# Patient Record
Sex: FEMALE | Race: WHITE | NOT HISPANIC OR LATINO | Employment: OTHER | URBAN - METROPOLITAN AREA
[De-identification: names, ages, dates, MRNs, and addresses within clinical notes are randomized per-mention and may not be internally consistent; named-entity substitution may affect disease eponyms.]

---

## 2017-01-01 ENCOUNTER — HISTORICAL (OUTPATIENT)
Dept: ADMINISTRATIVE | Facility: HOSPITAL | Age: 82
End: 2017-01-01

## 2017-01-01 ENCOUNTER — OFFICE VISIT (OUTPATIENT)
Dept: PULMONOLOGY | Facility: CLINIC | Age: 82
End: 2017-01-01
Payer: MEDICARE

## 2017-01-01 ENCOUNTER — TELEPHONE (OUTPATIENT)
Dept: PULMONOLOGY | Facility: CLINIC | Age: 82
End: 2017-01-01

## 2017-01-01 ENCOUNTER — HOSPITAL ENCOUNTER (INPATIENT)
Facility: HOSPITAL | Age: 82
LOS: 7 days | Discharge: HOSPICE | DRG: 280 | End: 2017-04-24
Attending: INTERNAL MEDICINE | Admitting: INTERNAL MEDICINE
Payer: MEDICARE

## 2017-01-01 VITALS
HEART RATE: 112 BPM | RESPIRATION RATE: 13 BRPM | HEIGHT: 63 IN | WEIGHT: 208.56 LBS | DIASTOLIC BLOOD PRESSURE: 65 MMHG | BODY MASS INDEX: 36.95 KG/M2 | OXYGEN SATURATION: 97 % | SYSTOLIC BLOOD PRESSURE: 100 MMHG | TEMPERATURE: 98 F

## 2017-01-01 VITALS
BODY MASS INDEX: 33.66 KG/M2 | HEIGHT: 63 IN | OXYGEN SATURATION: 94 % | HEART RATE: 54 BPM | DIASTOLIC BLOOD PRESSURE: 68 MMHG | SYSTOLIC BLOOD PRESSURE: 157 MMHG | WEIGHT: 190 LBS

## 2017-01-01 DIAGNOSIS — I50.9 HEART FAILURE, UNSPECIFIED: ICD-10-CM

## 2017-01-01 DIAGNOSIS — E11.40 TYPE 2 DIABETES MELLITUS WITH DIABETIC NEUROPATHY, WITH LONG-TERM CURRENT USE OF INSULIN: ICD-10-CM

## 2017-01-01 DIAGNOSIS — Z79.4 TYPE 2 DIABETES MELLITUS WITH DIABETIC NEUROPATHY, WITH LONG-TERM CURRENT USE OF INSULIN: ICD-10-CM

## 2017-01-01 DIAGNOSIS — E03.4 HYPOTHYROIDISM DUE TO ACQUIRED ATROPHY OF THYROID: ICD-10-CM

## 2017-01-01 DIAGNOSIS — I13.0 HYPERTENSIVE HEART AND KIDNEY DISEASE WITH HEART FAILURE: ICD-10-CM

## 2017-01-01 DIAGNOSIS — D64.9 ANEMIA, UNSPECIFIED TYPE: ICD-10-CM

## 2017-01-01 DIAGNOSIS — I48.0 PAROXYSMAL A-FIB: ICD-10-CM

## 2017-01-01 DIAGNOSIS — I25.810 CORONARY ARTERY DISEASE INVOLVING CORONARY BYPASS GRAFT OF NATIVE HEART WITHOUT ANGINA PECTORIS: ICD-10-CM

## 2017-01-01 DIAGNOSIS — N28.9 ACUTE RENAL INSUFFICIENCY: ICD-10-CM

## 2017-01-01 DIAGNOSIS — G47.33 OSA (OBSTRUCTIVE SLEEP APNEA): ICD-10-CM

## 2017-01-01 DIAGNOSIS — J45.20 INTERMITTENT ASTHMA, UNCOMPLICATED: ICD-10-CM

## 2017-01-01 DIAGNOSIS — R09.89 CHRONIC SINUS COMPLAINTS: ICD-10-CM

## 2017-01-01 DIAGNOSIS — Z98.890 S/P ROTATOR CUFF SURGERY: Primary | ICD-10-CM

## 2017-01-01 DIAGNOSIS — K76.1 CHRONIC PASSIVE CONGESTION OF LIVER: ICD-10-CM

## 2017-01-01 DIAGNOSIS — I21.09: ICD-10-CM

## 2017-01-01 DIAGNOSIS — R68.84 JAW PAIN: ICD-10-CM

## 2017-01-01 DIAGNOSIS — Q21.0 VSD (VENTRICULAR SEPTAL DEFECT): ICD-10-CM

## 2017-01-01 DIAGNOSIS — E11.42 DIABETIC POLYNEUROPATHY ASSOCIATED WITH TYPE 2 DIABETES MELLITUS: ICD-10-CM

## 2017-01-01 DIAGNOSIS — I10 ESSENTIAL HYPERTENSION: ICD-10-CM

## 2017-01-01 DIAGNOSIS — R74.01 ELEVATED TRANSAMINASE LEVEL: ICD-10-CM

## 2017-01-01 DIAGNOSIS — I21.09 ACUTE ANTERIOR WALL MI: ICD-10-CM

## 2017-01-01 LAB
ABO + RH BLD: NORMAL
ALBUMIN SERPL BCP-MCNC: 2.7 G/DL
ALBUMIN SERPL BCP-MCNC: 2.7 G/DL
ALBUMIN SERPL BCP-MCNC: 2.8 G/DL
ALBUMIN SERPL BCP-MCNC: 2.8 G/DL
ALBUMIN SERPL BCP-MCNC: 2.9 G/DL
ALBUMIN SERPL BCP-MCNC: 3 G/DL
ALLENS TEST: ABNORMAL
ALP SERPL-CCNC: 109 U/L
ALP SERPL-CCNC: 110 U/L
ALP SERPL-CCNC: 116 U/L
ALP SERPL-CCNC: 66 U/L
ALP SERPL-CCNC: 67 U/L
ALP SERPL-CCNC: 67 U/L
ALP SERPL-CCNC: 76 U/L
ALP SERPL-CCNC: 79 U/L
ALP SERPL-CCNC: 89 U/L
ALT SERPL W/O P-5'-P-CCNC: 104 U/L
ALT SERPL W/O P-5'-P-CCNC: 139 U/L
ALT SERPL W/O P-5'-P-CCNC: 158 U/L
ALT SERPL W/O P-5'-P-CCNC: 188 U/L
ALT SERPL W/O P-5'-P-CCNC: 44 U/L
ALT SERPL W/O P-5'-P-CCNC: 53 U/L
ALT SERPL W/O P-5'-P-CCNC: 53 U/L
ALT SERPL W/O P-5'-P-CCNC: 66 U/L
ALT SERPL W/O P-5'-P-CCNC: 81 U/L
ANION GAP SERPL CALC-SCNC: 10 MMOL/L
ANION GAP SERPL CALC-SCNC: 10 MMOL/L
ANION GAP SERPL CALC-SCNC: 12 MMOL/L
ANION GAP SERPL CALC-SCNC: 13 MMOL/L
ANION GAP SERPL CALC-SCNC: 13 MMOL/L
ANION GAP SERPL CALC-SCNC: 15 MMOL/L
ANION GAP SERPL CALC-SCNC: 15 MMOL/L
ANION GAP SERPL CALC-SCNC: 17 MMOL/L
ANISOCYTOSIS BLD QL SMEAR: ABNORMAL
AORTIC VALVE STENOSIS: ABNORMAL
APTT BLDCRRT: 27.1 SEC
AST SERPL-CCNC: 21 U/L
AST SERPL-CCNC: 22 U/L
AST SERPL-CCNC: 22 U/L
AST SERPL-CCNC: 24 U/L
AST SERPL-CCNC: 29 U/L
AST SERPL-CCNC: 39 U/L
AST SERPL-CCNC: 61 U/L
AST SERPL-CCNC: 62 U/L
AST SERPL-CCNC: 76 U/L
BACTERIA #/AREA URNS AUTO: ABNORMAL /HPF
BASOPHILS # BLD AUTO: 0 K/UL
BASOPHILS # BLD AUTO: 0.02 K/UL
BASOPHILS # BLD AUTO: 0.02 K/UL
BASOPHILS NFR BLD: 0 %
BASOPHILS NFR BLD: 0.2 %
BASOPHILS NFR BLD: 0.2 %
BILIRUB DIRECT SERPL-MCNC: 1 MG/DL
BILIRUB SERPL-MCNC: 1.2 MG/DL
BILIRUB SERPL-MCNC: 1.3 MG/DL
BILIRUB SERPL-MCNC: 1.4 MG/DL
BILIRUB SERPL-MCNC: 1.5 MG/DL
BILIRUB SERPL-MCNC: 1.6 MG/DL
BILIRUB SERPL-MCNC: 1.6 MG/DL
BILIRUB SERPL-MCNC: 1.7 MG/DL
BILIRUB UR QL STRIP: NEGATIVE
BLD GP AB SCN CELLS X3 SERPL QL: NORMAL
BNP SERPL-MCNC: 2688 PG/ML
BNP SERPL-MCNC: 3231 PG/ML
BUN SERPL-MCNC: 41 MG/DL
BUN SERPL-MCNC: 44 MG/DL
BUN SERPL-MCNC: 50 MG/DL
BUN SERPL-MCNC: 53 MG/DL
BUN SERPL-MCNC: 55 MG/DL
BUN SERPL-MCNC: 56 MG/DL
BUN SERPL-MCNC: 60 MG/DL
BUN SERPL-MCNC: 64 MG/DL
CALCIUM SERPL-MCNC: 8.5 MG/DL
CALCIUM SERPL-MCNC: 8.8 MG/DL
CALCIUM SERPL-MCNC: 8.8 MG/DL
CALCIUM SERPL-MCNC: 8.9 MG/DL
CALCIUM SERPL-MCNC: 9 MG/DL
CALCIUM SERPL-MCNC: 9.1 MG/DL
CALCIUM SERPL-MCNC: 9.2 MG/DL
CALCIUM SERPL-MCNC: 9.3 MG/DL
CHLORIDE SERPL-SCNC: 86 MMOL/L
CHLORIDE SERPL-SCNC: 90 MMOL/L
CHLORIDE SERPL-SCNC: 91 MMOL/L
CHLORIDE SERPL-SCNC: 91 MMOL/L
CHLORIDE SERPL-SCNC: 94 MMOL/L
CHLORIDE SERPL-SCNC: 94 MMOL/L
CHLORIDE SERPL-SCNC: 96 MMOL/L
CHLORIDE SERPL-SCNC: 98 MMOL/L
CHOLEST/HDLC SERPL: 6.3 {RATIO}
CLARITY UR REFRACT.AUTO: ABNORMAL
CO2 SERPL-SCNC: 22 MMOL/L
CO2 SERPL-SCNC: 27 MMOL/L
CO2 SERPL-SCNC: 27 MMOL/L
CO2 SERPL-SCNC: 28 MMOL/L
CO2 SERPL-SCNC: 29 MMOL/L
CO2 SERPL-SCNC: 32 MMOL/L
CO2 SERPL-SCNC: 33 MMOL/L
CO2 SERPL-SCNC: 35 MMOL/L
COLOR UR AUTO: ABNORMAL
CREAT SERPL-MCNC: 1.5 MG/DL
CREAT SERPL-MCNC: 1.6 MG/DL
CREAT SERPL-MCNC: 2.3 MG/DL
CREAT SERPL-MCNC: 2.3 MG/DL
CREAT SERPL-MCNC: 2.4 MG/DL
CREAT SERPL-MCNC: 2.5 MG/DL
CREAT SERPL-MCNC: 2.7 MG/DL
CREAT SERPL-MCNC: 2.9 MG/DL
DELSYS: ABNORMAL
DIASTOLIC DYSFUNCTION: YES
DIFFERENTIAL METHOD: ABNORMAL
EOSINOPHIL # BLD AUTO: 0 K/UL
EOSINOPHIL # BLD AUTO: 0.1 K/UL
EOSINOPHIL NFR BLD: 0 %
EOSINOPHIL NFR BLD: 0.1 %
EOSINOPHIL NFR BLD: 0.1 %
EOSINOPHIL NFR BLD: 0.3 %
EOSINOPHIL NFR BLD: 0.5 %
EOSINOPHIL NFR BLD: 0.5 %
EOSINOPHIL NFR BLD: 0.9 %
ERYTHROCYTE [DISTWIDTH] IN BLOOD BY AUTOMATED COUNT: 20.5 %
ERYTHROCYTE [DISTWIDTH] IN BLOOD BY AUTOMATED COUNT: 20.9 %
ERYTHROCYTE [DISTWIDTH] IN BLOOD BY AUTOMATED COUNT: 21.1 %
ERYTHROCYTE [DISTWIDTH] IN BLOOD BY AUTOMATED COUNT: 21.3 %
EST. GFR  (AFRICAN AMERICAN): 17 ML/MIN/1.73 M^2
EST. GFR  (AFRICAN AMERICAN): 18.5 ML/MIN/1.73 M^2
EST. GFR  (AFRICAN AMERICAN): 20.3 ML/MIN/1.73 M^2
EST. GFR  (AFRICAN AMERICAN): 21.3 ML/MIN/1.73 M^2
EST. GFR  (AFRICAN AMERICAN): 22.5 ML/MIN/1.73 M^2
EST. GFR  (AFRICAN AMERICAN): 22.5 ML/MIN/1.73 M^2
EST. GFR  (AFRICAN AMERICAN): 34.8 ML/MIN/1.73 M^2
EST. GFR  (AFRICAN AMERICAN): 37.7 ML/MIN/1.73 M^2
EST. GFR  (NON AFRICAN AMERICAN): 14.7 ML/MIN/1.73 M^2
EST. GFR  (NON AFRICAN AMERICAN): 16.1 ML/MIN/1.73 M^2
EST. GFR  (NON AFRICAN AMERICAN): 17.6 ML/MIN/1.73 M^2
EST. GFR  (NON AFRICAN AMERICAN): 18.5 ML/MIN/1.73 M^2
EST. GFR  (NON AFRICAN AMERICAN): 19.5 ML/MIN/1.73 M^2
EST. GFR  (NON AFRICAN AMERICAN): 19.5 ML/MIN/1.73 M^2
EST. GFR  (NON AFRICAN AMERICAN): 30.2 ML/MIN/1.73 M^2
EST. GFR  (NON AFRICAN AMERICAN): 32.7 ML/MIN/1.73 M^2
ESTIMATED AVG GLUCOSE: 174 MG/DL
ESTIMATED PA SYSTOLIC PRESSURE: 64.25
FACT X PPP CHRO-ACNC: 0.23 IU/ML
FACT X PPP CHRO-ACNC: 0.27 IU/ML
FACT X PPP CHRO-ACNC: 0.41 IU/ML
FACT X PPP CHRO-ACNC: 0.47 IU/ML
FACT X PPP CHRO-ACNC: 0.6 IU/ML
FACT X PPP CHRO-ACNC: 0.62 IU/ML
FACT X PPP CHRO-ACNC: 0.71 IU/ML
FACT X PPP CHRO-ACNC: 0.82 IU/ML
FACT X PPP CHRO-ACNC: 0.84 IU/ML
FACT X PPP CHRO-ACNC: 0.99 IU/ML
FACT X PPP CHRO-ACNC: 1.02 IU/ML
FACT X PPP CHRO-ACNC: 1.1 IU/ML
FACT X PPP CHRO-ACNC: 1.2 IU/ML
FACT X PPP CHRO-ACNC: 1.35 IU/ML
FACT X PPP CHRO-ACNC: 1.35 IU/ML
FERRITIN SERPL-MCNC: 63 NG/ML
FOLATE SERPL-MCNC: 17 NG/ML
GLUCOSE SERPL-MCNC: 152 MG/DL
GLUCOSE SERPL-MCNC: 196 MG/DL
GLUCOSE SERPL-MCNC: 215 MG/DL
GLUCOSE SERPL-MCNC: 220 MG/DL
GLUCOSE SERPL-MCNC: 230 MG/DL
GLUCOSE SERPL-MCNC: 272 MG/DL
GLUCOSE SERPL-MCNC: 295 MG/DL
GLUCOSE SERPL-MCNC: 300 MG/DL
GLUCOSE UR QL STRIP: NEGATIVE
HAPTOGLOB SERPL-MCNC: 123 MG/DL
HBA1C MFR BLD HPLC: 7.7 %
HCO3 UR-SCNC: 25.3 MMOL/L (ref 24–28)
HCT VFR BLD AUTO: 27.2 %
HCT VFR BLD AUTO: 28.6 %
HCT VFR BLD AUTO: 28.7 %
HCT VFR BLD AUTO: 29.1 %
HCT VFR BLD AUTO: 30.5 %
HCT VFR BLD AUTO: 30.8 %
HCT VFR BLD AUTO: 33.6 %
HDL/CHOLESTEROL RATIO: 16 %
HDLC SERPL-MCNC: 119 MG/DL
HDLC SERPL-MCNC: 19 MG/DL
HGB BLD-MCNC: 10.4 G/DL
HGB BLD-MCNC: 8.4 G/DL
HGB BLD-MCNC: 8.5 G/DL
HGB BLD-MCNC: 8.7 G/DL
HGB BLD-MCNC: 8.8 G/DL
HGB BLD-MCNC: 9.5 G/DL
HGB BLD-MCNC: 9.6 G/DL
HGB UR QL STRIP: ABNORMAL
HYALINE CASTS UR QL AUTO: 0 /LPF
INR PPP: 1.2
INR PPP: 1.3
INR PPP: 1.3
INR PPP: 1.5
IRON SERPL-MCNC: 29 UG/DL
KETONES UR QL STRIP: NEGATIVE
LACTATE SERPL-SCNC: 1.3 MMOL/L
LACTATE SERPL-SCNC: 2 MMOL/L
LDH SERPL L TO P-CCNC: 256 U/L
LDLC SERPL CALC-MCNC: 87 MG/DL
LEUKOCYTE ESTERASE UR QL STRIP: ABNORMAL
LYMPHOCYTES # BLD AUTO: 1.4 K/UL
LYMPHOCYTES # BLD AUTO: 2.1 K/UL
LYMPHOCYTES # BLD AUTO: 2.1 K/UL
LYMPHOCYTES # BLD AUTO: 2.7 K/UL
LYMPHOCYTES # BLD AUTO: 2.8 K/UL
LYMPHOCYTES # BLD AUTO: 4 K/UL
LYMPHOCYTES # BLD AUTO: 4.6 K/UL
LYMPHOCYTES NFR BLD: 17.5 %
LYMPHOCYTES NFR BLD: 27.3 %
LYMPHOCYTES NFR BLD: 27.8 %
LYMPHOCYTES NFR BLD: 34.4 %
LYMPHOCYTES NFR BLD: 38.5 %
LYMPHOCYTES NFR BLD: 49.4 %
LYMPHOCYTES NFR BLD: 56.4 %
MAGNESIUM SERPL-MCNC: 1.5 MG/DL
MAGNESIUM SERPL-MCNC: 1.8 MG/DL
MAGNESIUM SERPL-MCNC: 1.9 MG/DL
MAGNESIUM SERPL-MCNC: 2.1 MG/DL
MCH RBC QN AUTO: 24.5 PG
MCH RBC QN AUTO: 24.9 PG
MCH RBC QN AUTO: 25.1 PG
MCH RBC QN AUTO: 25.3 PG
MCH RBC QN AUTO: 25.5 PG
MCHC RBC AUTO-ENTMCNC: 29.4 %
MCHC RBC AUTO-ENTMCNC: 29.9 %
MCHC RBC AUTO-ENTMCNC: 30.7 %
MCHC RBC AUTO-ENTMCNC: 31 %
MCHC RBC AUTO-ENTMCNC: 31.1 %
MCHC RBC AUTO-ENTMCNC: 31.2 %
MCHC RBC AUTO-ENTMCNC: 31.3 %
MCV RBC AUTO: 81 FL
MCV RBC AUTO: 81 FL
MCV RBC AUTO: 82 FL
MCV RBC AUTO: 83 FL
MCV RBC AUTO: 83 FL
MICROSCOPIC COMMENT: ABNORMAL
MITRAL VALVE MOBILITY: NORMAL
MITRAL VALVE REGURGITATION: ABNORMAL
MONOCYTES # BLD AUTO: 0.6 K/UL
MONOCYTES # BLD AUTO: 0.7 K/UL
MONOCYTES # BLD AUTO: 0.8 K/UL
MONOCYTES # BLD AUTO: 1 K/UL
MONOCYTES # BLD AUTO: 1 K/UL
MONOCYTES NFR BLD: 10.2 %
MONOCYTES NFR BLD: 10.3 %
MONOCYTES NFR BLD: 11.7 %
MONOCYTES NFR BLD: 8.7 %
MONOCYTES NFR BLD: 9.1 %
MONOCYTES NFR BLD: 9.6 %
MONOCYTES NFR BLD: 9.7 %
NEUTROPHILS # BLD AUTO: 2.8 K/UL
NEUTROPHILS # BLD AUTO: 3.1 K/UL
NEUTROPHILS # BLD AUTO: 3.3 K/UL
NEUTROPHILS # BLD AUTO: 3.7 K/UL
NEUTROPHILS # BLD AUTO: 4.6 K/UL
NEUTROPHILS # BLD AUTO: 6 K/UL
NEUTROPHILS # BLD AUTO: 6.3 K/UL
NEUTROPHILS NFR BLD: 34 %
NEUTROPHILS NFR BLD: 37.7 %
NEUTROPHILS NFR BLD: 51.3 %
NEUTROPHILS NFR BLD: 54.9 %
NEUTROPHILS NFR BLD: 61.5 %
NEUTROPHILS NFR BLD: 62.6 %
NEUTROPHILS NFR BLD: 73.4 %
NITRITE UR QL STRIP: NEGATIVE
NONHDLC SERPL-MCNC: 100 MG/DL
OVALOCYTES BLD QL SMEAR: ABNORMAL
PCO2 BLDA: 33.9 MMHG (ref 35–45)
PH SMN: 7.48 [PH] (ref 7.35–7.45)
PH UR STRIP: 5 [PH] (ref 5–8)
PLATELET # BLD AUTO: 171 K/UL
PLATELET # BLD AUTO: 175 K/UL
PLATELET # BLD AUTO: 181 K/UL
PLATELET # BLD AUTO: 213 K/UL
PLATELET # BLD AUTO: 224 K/UL
PLATELET # BLD AUTO: 263 K/UL
PLATELET # BLD AUTO: 276 K/UL
PLATELET BLD QL SMEAR: ABNORMAL
PMV BLD AUTO: 10.2 FL
PMV BLD AUTO: 10.3 FL
PMV BLD AUTO: 10.3 FL
PMV BLD AUTO: 10.5 FL
PMV BLD AUTO: 10.6 FL
PO2 BLDA: 70 MMHG (ref 80–100)
POC BE: 2 MMOL/L
POC SATURATED O2: 95 % (ref 95–100)
POC TCO2: 26 MMOL/L (ref 23–27)
POCT GLUCOSE: 123 MG/DL (ref 70–110)
POCT GLUCOSE: 170 MG/DL (ref 70–110)
POCT GLUCOSE: 189 MG/DL (ref 70–110)
POCT GLUCOSE: 203 MG/DL (ref 70–110)
POCT GLUCOSE: 206 MG/DL (ref 70–110)
POCT GLUCOSE: 226 MG/DL (ref 70–110)
POCT GLUCOSE: 235 MG/DL (ref 70–110)
POCT GLUCOSE: 237 MG/DL (ref 70–110)
POCT GLUCOSE: 237 MG/DL (ref 70–110)
POCT GLUCOSE: 243 MG/DL (ref 70–110)
POCT GLUCOSE: 254 MG/DL (ref 70–110)
POCT GLUCOSE: 255 MG/DL (ref 70–110)
POCT GLUCOSE: 261 MG/DL (ref 70–110)
POCT GLUCOSE: 265 MG/DL (ref 70–110)
POCT GLUCOSE: 277 MG/DL (ref 70–110)
POCT GLUCOSE: 294 MG/DL (ref 70–110)
POCT GLUCOSE: 296 MG/DL (ref 70–110)
POCT GLUCOSE: 304 MG/DL (ref 70–110)
POCT GLUCOSE: 306 MG/DL (ref 70–110)
POCT GLUCOSE: 306 MG/DL (ref 70–110)
POCT GLUCOSE: 316 MG/DL (ref 70–110)
POCT GLUCOSE: 318 MG/DL (ref 70–110)
POCT GLUCOSE: 320 MG/DL (ref 70–110)
POCT GLUCOSE: 333 MG/DL (ref 70–110)
POCT GLUCOSE: 334 MG/DL (ref 70–110)
POCT GLUCOSE: 349 MG/DL (ref 70–110)
POCT GLUCOSE: 354 MG/DL (ref 70–110)
POCT GLUCOSE: 371 MG/DL (ref 70–110)
POIKILOCYTOSIS BLD QL SMEAR: SLIGHT
POLYCHROMASIA BLD QL SMEAR: ABNORMAL
POTASSIUM SERPL-SCNC: 3.3 MMOL/L
POTASSIUM SERPL-SCNC: 3.3 MMOL/L
POTASSIUM SERPL-SCNC: 3.4 MMOL/L
POTASSIUM SERPL-SCNC: 3.5 MMOL/L
POTASSIUM SERPL-SCNC: 3.7 MMOL/L
POTASSIUM SERPL-SCNC: 4 MMOL/L
POTASSIUM SERPL-SCNC: 4.3 MMOL/L
POTASSIUM SERPL-SCNC: 4.7 MMOL/L
PROT SERPL-MCNC: 6 G/DL
PROT SERPL-MCNC: 6.2 G/DL
PROT SERPL-MCNC: 6.4 G/DL
PROT SERPL-MCNC: 6.4 G/DL
PROT SERPL-MCNC: 6.5 G/DL
PROT SERPL-MCNC: 6.7 G/DL
PROT SERPL-MCNC: 7 G/DL
PROT UR QL STRIP: ABNORMAL
PROTHROMBIN TIME: 12.4 SEC
PROTHROMBIN TIME: 13.5 SEC
PROTHROMBIN TIME: 13.7 SEC
PROTHROMBIN TIME: 15.2 SEC
RBC # BLD AUTO: 3.36 M/UL
RBC # BLD AUTO: 3.43 M/UL
RBC # BLD AUTO: 3.49 M/UL
RBC # BLD AUTO: 3.51 M/UL
RBC # BLD AUTO: 3.72 M/UL
RBC # BLD AUTO: 3.79 M/UL
RBC # BLD AUTO: 4.11 M/UL
RBC #/AREA URNS AUTO: >100 /HPF (ref 0–4)
RETICS/RBC NFR AUTO: 5.7 %
RETIRED EF AND QEF - SEE NOTES: 20 (ref 55–65)
SAMPLE: ABNORMAL
SATURATED IRON: 7 %
SCHISTOCYTES BLD QL SMEAR: PRESENT
SITE: ABNORMAL
SODIUM SERPL-SCNC: 133 MMOL/L
SODIUM SERPL-SCNC: 133 MMOL/L
SODIUM SERPL-SCNC: 134 MMOL/L
SODIUM SERPL-SCNC: 134 MMOL/L
SODIUM SERPL-SCNC: 135 MMOL/L
SODIUM SERPL-SCNC: 136 MMOL/L
SODIUM SERPL-SCNC: 136 MMOL/L
SODIUM SERPL-SCNC: 137 MMOL/L
SP GR UR STRIP: 1.01 (ref 1–1.03)
T4 FREE SERPL-MCNC: 1.14 NG/DL
TOTAL IRON BINDING CAPACITY: 420 UG/DL
TRANSFERRIN SERPL-MCNC: 284 MG/DL
TRICUSPID VALVE REGURGITATION: ABNORMAL
TRIGL SERPL-MCNC: 65 MG/DL
TROPONIN I SERPL DL<=0.01 NG/ML-MCNC: 0.38 NG/ML
TROPONIN I SERPL DL<=0.01 NG/ML-MCNC: 0.39 NG/ML
TSH SERPL DL<=0.005 MIU/L-ACNC: 2.58 UIU/ML
URN SPEC COLLECT METH UR: ABNORMAL
UROBILINOGEN UR STRIP-ACNC: NEGATIVE EU/DL
WBC # BLD AUTO: 10.01 K/UL
WBC # BLD AUTO: 6.05 K/UL
WBC # BLD AUTO: 7.3 K/UL
WBC # BLD AUTO: 7.49 K/UL
WBC # BLD AUTO: 8.1 K/UL
WBC # BLD AUTO: 8.17 K/UL
WBC # BLD AUTO: 8.17 K/UL
WBC #/AREA URNS AUTO: 4 /HPF (ref 0–5)

## 2017-01-01 PROCEDURE — 63600175 PHARM REV CODE 636 W HCPCS: Performed by: NURSE PRACTITIONER

## 2017-01-01 PROCEDURE — 99233 SBSQ HOSP IP/OBS HIGH 50: CPT | Mod: ,,, | Performed by: NURSE PRACTITIONER

## 2017-01-01 PROCEDURE — 83735 ASSAY OF MAGNESIUM: CPT

## 2017-01-01 PROCEDURE — 85520 HEPARIN ASSAY: CPT | Mod: 91

## 2017-01-01 PROCEDURE — 25000003 PHARM REV CODE 250: Performed by: INTERNAL MEDICINE

## 2017-01-01 PROCEDURE — 99223 1ST HOSP IP/OBS HIGH 75: CPT | Mod: ,,, | Performed by: INTERNAL MEDICINE

## 2017-01-01 PROCEDURE — 25000003 PHARM REV CODE 250: Performed by: NURSE PRACTITIONER

## 2017-01-01 PROCEDURE — 63600175 PHARM REV CODE 636 W HCPCS: Performed by: INTERNAL MEDICINE

## 2017-01-01 PROCEDURE — 85045 AUTOMATED RETICULOCYTE COUNT: CPT

## 2017-01-01 PROCEDURE — 25000003 PHARM REV CODE 250: Performed by: HOSPITALIST

## 2017-01-01 PROCEDURE — 93005 ELECTROCARDIOGRAM TRACING: CPT

## 2017-01-01 PROCEDURE — 84443 ASSAY THYROID STIM HORMONE: CPT

## 2017-01-01 PROCEDURE — 80053 COMPREHEN METABOLIC PANEL: CPT

## 2017-01-01 PROCEDURE — 85730 THROMBOPLASTIN TIME PARTIAL: CPT

## 2017-01-01 PROCEDURE — 80061 LIPID PANEL: CPT

## 2017-01-01 PROCEDURE — 76937 US GUIDE VASCULAR ACCESS: CPT

## 2017-01-01 PROCEDURE — 27000221 HC OXYGEN, UP TO 24 HOURS

## 2017-01-01 PROCEDURE — 99233 SBSQ HOSP IP/OBS HIGH 50: CPT | Mod: ,,, | Performed by: INTERNAL MEDICINE

## 2017-01-01 PROCEDURE — 93010 ELECTROCARDIOGRAM REPORT: CPT | Mod: ,,, | Performed by: INTERNAL MEDICINE

## 2017-01-01 PROCEDURE — 85025 COMPLETE CBC W/AUTO DIFF WBC: CPT

## 2017-01-01 PROCEDURE — 94760 N-INVAS EAR/PLS OXIMETRY 1: CPT

## 2017-01-01 PROCEDURE — 85520 HEPARIN ASSAY: CPT

## 2017-01-01 PROCEDURE — 94640 AIRWAY INHALATION TREATMENT: CPT

## 2017-01-01 PROCEDURE — 36415 COLL VENOUS BLD VENIPUNCTURE: CPT

## 2017-01-01 PROCEDURE — 1126F AMNT PAIN NOTED NONE PRSNT: CPT | Mod: S$GLB,,, | Performed by: INTERNAL MEDICINE

## 2017-01-01 PROCEDURE — 25000003 PHARM REV CODE 250

## 2017-01-01 PROCEDURE — 99232 SBSQ HOSP IP/OBS MODERATE 35: CPT | Mod: ,,, | Performed by: INTERNAL MEDICINE

## 2017-01-01 PROCEDURE — 93306 TTE W/DOPPLER COMPLETE: CPT | Mod: 26,,, | Performed by: INTERNAL MEDICINE

## 2017-01-01 PROCEDURE — 97530 THERAPEUTIC ACTIVITIES: CPT

## 2017-01-01 PROCEDURE — 82746 ASSAY OF FOLIC ACID SERUM: CPT

## 2017-01-01 PROCEDURE — 99239 HOSP IP/OBS DSCHRG MGMT >30: CPT | Mod: ,,, | Performed by: INTERNAL MEDICINE

## 2017-01-01 PROCEDURE — 83605 ASSAY OF LACTIC ACID: CPT

## 2017-01-01 PROCEDURE — 99291 CRITICAL CARE FIRST HOUR: CPT | Mod: ,,, | Performed by: INTERNAL MEDICINE

## 2017-01-01 PROCEDURE — 94761 N-INVAS EAR/PLS OXIMETRY MLT: CPT

## 2017-01-01 PROCEDURE — 83880 ASSAY OF NATRIURETIC PEPTIDE: CPT

## 2017-01-01 PROCEDURE — 02HV33Z INSERTION OF INFUSION DEVICE INTO SUPERIOR VENA CAVA, PERCUTANEOUS APPROACH: ICD-10-PCS | Performed by: INTERNAL MEDICINE

## 2017-01-01 PROCEDURE — 85610 PROTHROMBIN TIME: CPT

## 2017-01-01 PROCEDURE — 86900 BLOOD TYPING SEROLOGIC ABO: CPT

## 2017-01-01 PROCEDURE — 1157F ADVNC CARE PLAN IN RCRD: CPT | Mod: S$GLB,,, | Performed by: INTERNAL MEDICINE

## 2017-01-01 PROCEDURE — 36600 WITHDRAWAL OF ARTERIAL BLOOD: CPT

## 2017-01-01 PROCEDURE — 83036 HEMOGLOBIN GLYCOSYLATED A1C: CPT

## 2017-01-01 PROCEDURE — C1751 CATH, INF, PER/CENT/MIDLINE: HCPCS

## 2017-01-01 PROCEDURE — 20000000 HC ICU ROOM

## 2017-01-01 PROCEDURE — 81001 URINALYSIS AUTO W/SCOPE: CPT

## 2017-01-01 PROCEDURE — 20600001 HC STEP DOWN PRIVATE ROOM

## 2017-01-01 PROCEDURE — 83540 ASSAY OF IRON: CPT

## 2017-01-01 PROCEDURE — 84484 ASSAY OF TROPONIN QUANT: CPT | Mod: 91

## 2017-01-01 PROCEDURE — 99223 1ST HOSP IP/OBS HIGH 75: CPT | Mod: ,,, | Performed by: NURSE PRACTITIONER

## 2017-01-01 PROCEDURE — 82803 BLOOD GASES ANY COMBINATION: CPT

## 2017-01-01 PROCEDURE — 84439 ASSAY OF FREE THYROXINE: CPT

## 2017-01-01 PROCEDURE — 1159F MED LIST DOCD IN RCRD: CPT | Mod: S$GLB,,, | Performed by: INTERNAL MEDICINE

## 2017-01-01 PROCEDURE — 99238 HOSP IP/OBS DSCHRG MGMT 30/<: CPT | Mod: ,,, | Performed by: INTERNAL MEDICINE

## 2017-01-01 PROCEDURE — 80076 HEPATIC FUNCTION PANEL: CPT

## 2017-01-01 PROCEDURE — 86901 BLOOD TYPING SEROLOGIC RH(D): CPT

## 2017-01-01 PROCEDURE — 63600175 PHARM REV CODE 636 W HCPCS: Performed by: STUDENT IN AN ORGANIZED HEALTH CARE EDUCATION/TRAINING PROGRAM

## 2017-01-01 PROCEDURE — 1160F RVW MEDS BY RX/DR IN RCRD: CPT | Mod: S$GLB,,, | Performed by: INTERNAL MEDICINE

## 2017-01-01 PROCEDURE — 83010 ASSAY OF HAPTOGLOBIN QUANT: CPT

## 2017-01-01 PROCEDURE — 36569 INSJ PICC 5 YR+ W/O IMAGING: CPT

## 2017-01-01 PROCEDURE — 97165 OT EVAL LOW COMPLEX 30 MIN: CPT

## 2017-01-01 PROCEDURE — 97802 MEDICAL NUTRITION INDIV IN: CPT

## 2017-01-01 PROCEDURE — 99214 OFFICE O/P EST MOD 30 MIN: CPT | Mod: S$GLB,,, | Performed by: INTERNAL MEDICINE

## 2017-01-01 PROCEDURE — 97803 MED NUTRITION INDIV SUBSEQ: CPT

## 2017-01-01 PROCEDURE — 97162 PT EVAL MOD COMPLEX 30 MIN: CPT

## 2017-01-01 PROCEDURE — 83615 LACTATE (LD) (LDH) ENZYME: CPT

## 2017-01-01 PROCEDURE — 99999 PR PBB SHADOW E&M-EST. PATIENT-LVL V: CPT | Mod: PBBFAC,,, | Performed by: INTERNAL MEDICINE

## 2017-01-01 PROCEDURE — 84484 ASSAY OF TROPONIN QUANT: CPT

## 2017-01-01 PROCEDURE — C8929 TTE W OR WO FOL WCON,DOPPLER: HCPCS

## 2017-01-01 PROCEDURE — 82728 ASSAY OF FERRITIN: CPT

## 2017-01-01 RX ORDER — SIMETHICONE 80 MG
80 TABLET,CHEWABLE ORAL 3 TIMES DAILY PRN
Refills: 0 | COMMUNITY
Start: 2017-01-01 | End: 2017-01-01 | Stop reason: HOSPADM

## 2017-01-01 RX ORDER — FUROSEMIDE 10 MG/ML
80 INJECTION INTRAMUSCULAR; INTRAVENOUS 3 TIMES DAILY
Status: DISCONTINUED | OUTPATIENT
Start: 2017-01-01 | End: 2017-01-01

## 2017-01-01 RX ORDER — LEVALBUTEROL INHALATION SOLUTION 1.25 MG/3ML
1 SOLUTION RESPIRATORY (INHALATION) EVERY 4 HOURS PRN
Qty: 90 ML | Refills: 5 | Status: ON HOLD | OUTPATIENT
Start: 2017-01-01 | End: 2017-01-01 | Stop reason: HOSPADM

## 2017-01-01 RX ORDER — AZITHROMYCIN 500 MG/1
TABLET, FILM COATED ORAL
Qty: 3 TABLET | Refills: 3 | Status: ON HOLD | OUTPATIENT
Start: 2017-01-01 | End: 2017-01-01

## 2017-01-01 RX ORDER — LEVALBUTEROL 1.25 MG/.5ML
1.25 SOLUTION, CONCENTRATE RESPIRATORY (INHALATION) EVERY 4 HOURS PRN
Status: DISCONTINUED | OUTPATIENT
Start: 2017-01-01 | End: 2017-01-01 | Stop reason: HOSPADM

## 2017-01-01 RX ORDER — FUROSEMIDE 10 MG/ML
80 INJECTION INTRAMUSCULAR; INTRAVENOUS ONCE
Status: COMPLETED | OUTPATIENT
Start: 2017-01-01 | End: 2017-01-01

## 2017-01-01 RX ORDER — FUROSEMIDE 10 MG/ML
80 INJECTION INTRAMUSCULAR; INTRAVENOUS 2 TIMES DAILY
Status: DISCONTINUED | OUTPATIENT
Start: 2017-01-01 | End: 2017-01-01

## 2017-01-01 RX ORDER — RAMELTEON 8 MG/1
8 TABLET ORAL NIGHTLY PRN
Status: DISCONTINUED | OUTPATIENT
Start: 2017-01-01 | End: 2017-01-01

## 2017-01-01 RX ORDER — DIPHENHYDRAMINE HCL 25 MG
CAPSULE ORAL
Status: COMPLETED
Start: 2017-01-01 | End: 2017-01-01

## 2017-01-01 RX ORDER — IBUPROFEN 200 MG
16 TABLET ORAL
Status: DISCONTINUED | OUTPATIENT
Start: 2017-01-01 | End: 2017-01-01

## 2017-01-01 RX ORDER — GLUCAGON 1 MG
1 KIT INJECTION
Status: DISCONTINUED | OUTPATIENT
Start: 2017-01-01 | End: 2017-01-01

## 2017-01-01 RX ORDER — FUROSEMIDE 10 MG/ML
40 INJECTION INTRAMUSCULAR; INTRAVENOUS 2 TIMES DAILY
Status: DISCONTINUED | OUTPATIENT
Start: 2017-01-01 | End: 2017-01-01

## 2017-01-01 RX ORDER — INSULIN ASPART 100 [IU]/ML
0-5 INJECTION, SOLUTION INTRAVENOUS; SUBCUTANEOUS
Status: DISCONTINUED | OUTPATIENT
Start: 2017-01-01 | End: 2017-01-01

## 2017-01-01 RX ORDER — EPINEPHRINE 0.22MG
1 AEROSOL WITH ADAPTER (ML) INHALATION DAILY
Status: DISCONTINUED | OUTPATIENT
Start: 2017-01-01 | End: 2017-01-01

## 2017-01-01 RX ORDER — ACETAMINOPHEN 325 MG/1
650 TABLET ORAL EVERY 4 HOURS PRN
Status: DISCONTINUED | OUTPATIENT
Start: 2017-01-01 | End: 2017-01-01 | Stop reason: HOSPADM

## 2017-01-01 RX ORDER — PRAVASTATIN SODIUM 20 MG/1
20 TABLET ORAL NIGHTLY
Status: DISCONTINUED | OUTPATIENT
Start: 2017-01-01 | End: 2017-01-01

## 2017-01-01 RX ORDER — AMOXICILLIN 250 MG
1 CAPSULE ORAL DAILY PRN
Status: DISCONTINUED | OUTPATIENT
Start: 2017-01-01 | End: 2017-01-01

## 2017-01-01 RX ORDER — HYDROCODONE BITARTRATE AND ACETAMINOPHEN 5; 325 MG/1; MG/1
1 TABLET ORAL EVERY 8 HOURS PRN
Status: DISCONTINUED | OUTPATIENT
Start: 2017-01-01 | End: 2017-01-01

## 2017-01-01 RX ORDER — HYDRALAZINE HYDROCHLORIDE 25 MG/1
25 TABLET, FILM COATED ORAL EVERY 6 HOURS
Status: DISCONTINUED | OUTPATIENT
Start: 2017-01-01 | End: 2017-01-01

## 2017-01-01 RX ORDER — DIPHENHYDRAMINE HCL 25 MG
25 CAPSULE ORAL ONCE
Status: DISCONTINUED | OUTPATIENT
Start: 2017-01-01 | End: 2017-01-01

## 2017-01-01 RX ORDER — POLYETHYLENE GLYCOL 3350 17 G/17G
17 POWDER, FOR SOLUTION ORAL 2 TIMES DAILY PRN
Status: DISCONTINUED | OUTPATIENT
Start: 2017-01-01 | End: 2017-01-01 | Stop reason: HOSPADM

## 2017-01-01 RX ORDER — POTASSIUM CHLORIDE 750 MG/1
20 CAPSULE, EXTENDED RELEASE ORAL ONCE
Status: COMPLETED | OUTPATIENT
Start: 2017-01-01 | End: 2017-01-01

## 2017-01-01 RX ORDER — CLOPIDOGREL BISULFATE 75 MG/1
75 TABLET ORAL DAILY
Status: DISCONTINUED | OUTPATIENT
Start: 2017-01-01 | End: 2017-01-01

## 2017-01-01 RX ORDER — FAMOTIDINE 20 MG/1
20 TABLET, FILM COATED ORAL 2 TIMES DAILY
Status: COMPLETED | OUTPATIENT
Start: 2017-01-01 | End: 2017-01-01

## 2017-01-01 RX ORDER — POTASSIUM CHLORIDE 29.8 MG/ML
40 INJECTION INTRAVENOUS ONCE
Status: COMPLETED | OUTPATIENT
Start: 2017-01-01 | End: 2017-01-01

## 2017-01-01 RX ORDER — DULOXETIN HYDROCHLORIDE 30 MG/1
60 CAPSULE, DELAYED RELEASE ORAL NIGHTLY
Status: DISCONTINUED | OUTPATIENT
Start: 2017-01-01 | End: 2017-01-01

## 2017-01-01 RX ORDER — DULOXETIN HYDROCHLORIDE 60 MG/1
60 CAPSULE, DELAYED RELEASE ORAL DAILY
Status: DISCONTINUED | OUTPATIENT
Start: 2017-01-01 | End: 2017-01-01

## 2017-01-01 RX ORDER — ONDANSETRON 2 MG/ML
4 INJECTION INTRAMUSCULAR; INTRAVENOUS EVERY 12 HOURS PRN
Status: DISCONTINUED | OUTPATIENT
Start: 2017-01-01 | End: 2017-01-01 | Stop reason: HOSPADM

## 2017-01-01 RX ORDER — FERROUS SULFATE 325(65) MG
325 TABLET, DELAYED RELEASE (ENTERIC COATED) ORAL 2 TIMES DAILY
Status: DISCONTINUED | OUTPATIENT
Start: 2017-01-01 | End: 2017-01-01

## 2017-01-01 RX ORDER — TEMAZEPAM 15 MG/1
30 CAPSULE ORAL NIGHTLY
Status: DISCONTINUED | OUTPATIENT
Start: 2017-01-01 | End: 2017-01-01

## 2017-01-01 RX ORDER — IBUPROFEN 200 MG
24 TABLET ORAL
Status: DISCONTINUED | OUTPATIENT
Start: 2017-01-01 | End: 2017-01-01

## 2017-01-01 RX ORDER — SODIUM CHLORIDE 0.9 % (FLUSH) 0.9 %
10 SYRINGE (ML) INJECTION
Status: DISCONTINUED | OUTPATIENT
Start: 2017-01-01 | End: 2017-01-01 | Stop reason: HOSPADM

## 2017-01-01 RX ORDER — TEMAZEPAM 7.5 MG/1
30 CAPSULE ORAL NIGHTLY
Status: DISCONTINUED | OUTPATIENT
Start: 2017-01-01 | End: 2017-01-01

## 2017-01-01 RX ORDER — LEVALBUTEROL INHALATION SOLUTION 0.63 MG/3ML
SOLUTION RESPIRATORY (INHALATION)
Refills: 3 | Status: ON HOLD | COMMUNITY
Start: 2017-01-01 | End: 2017-01-01

## 2017-01-01 RX ORDER — DULOXETIN HYDROCHLORIDE 60 MG/1
60 CAPSULE, DELAYED RELEASE ORAL NIGHTLY
Status: DISCONTINUED | OUTPATIENT
Start: 2017-01-01 | End: 2017-01-01 | Stop reason: HOSPADM

## 2017-01-01 RX ORDER — DOBUTAMINE HYDROCHLORIDE 400 MG/100ML
2.5 INJECTION INTRAVENOUS CONTINUOUS
Status: DISCONTINUED | OUTPATIENT
Start: 2017-01-01 | End: 2017-01-01

## 2017-01-01 RX ORDER — CLOBETASOL PROPIONATE 0.5 MG/G
OINTMENT TOPICAL
Refills: 1 | Status: ON HOLD | COMMUNITY
Start: 2017-01-01 | End: 2017-01-01 | Stop reason: CLARIF

## 2017-01-01 RX ORDER — ISOSORBIDE DINITRATE 20 MG/1
20 TABLET ORAL EVERY 6 HOURS
Status: DISCONTINUED | OUTPATIENT
Start: 2017-01-01 | End: 2017-01-01

## 2017-01-01 RX ORDER — ISOSORBIDE DINITRATE 10 MG/1
10 TABLET ORAL EVERY 6 HOURS
Status: DISCONTINUED | OUTPATIENT
Start: 2017-01-01 | End: 2017-01-01

## 2017-01-01 RX ORDER — MAG HYDROX/ALUMINUM HYD/SIMETH 200-200-20
30 SUSPENSION, ORAL (FINAL DOSE FORM) ORAL
Status: DISCONTINUED | OUTPATIENT
Start: 2017-01-01 | End: 2017-01-01

## 2017-01-01 RX ORDER — LORAZEPAM 0.5 MG/1
0.5 TABLET ORAL EVERY 6 HOURS PRN
Status: DISCONTINUED | OUTPATIENT
Start: 2017-01-01 | End: 2017-01-01 | Stop reason: HOSPADM

## 2017-01-01 RX ORDER — ZOLPIDEM TARTRATE 5 MG/1
5 TABLET ORAL NIGHTLY PRN
Status: DISCONTINUED | OUTPATIENT
Start: 2017-01-01 | End: 2017-01-01 | Stop reason: HOSPADM

## 2017-01-01 RX ORDER — SIMETHICONE 80 MG
1 TABLET,CHEWABLE ORAL 3 TIMES DAILY PRN
Status: DISCONTINUED | OUTPATIENT
Start: 2017-01-01 | End: 2017-01-01 | Stop reason: HOSPADM

## 2017-01-01 RX ORDER — METOLAZONE 5 MG/1
5 TABLET ORAL ONCE
Status: COMPLETED | OUTPATIENT
Start: 2017-01-01 | End: 2017-01-01

## 2017-01-01 RX ORDER — LIDOCAINE HYDROCHLORIDE 10 MG/ML
INJECTION INFILTRATION; PERINEURAL
Status: COMPLETED
Start: 2017-01-01 | End: 2017-01-01

## 2017-01-01 RX ORDER — CHOLECALCIFEROL (VITAMIN D3) 25 MCG
1000 TABLET ORAL DAILY
Status: DISCONTINUED | OUTPATIENT
Start: 2017-01-01 | End: 2017-01-01

## 2017-01-01 RX ORDER — SODIUM CHLORIDE 0.9 % (FLUSH) 0.9 %
3 SYRINGE (ML) INJECTION EVERY 8 HOURS
Status: DISCONTINUED | OUTPATIENT
Start: 2017-01-01 | End: 2017-01-01

## 2017-01-01 RX ORDER — LIDOCAINE HYDROCHLORIDE 10 MG/ML
1 INJECTION INFILTRATION; PERINEURAL ONCE
Status: COMPLETED | OUTPATIENT
Start: 2017-01-01 | End: 2017-01-01

## 2017-01-01 RX ORDER — AMLODIPINE BESYLATE 5 MG/1
5 TABLET ORAL DAILY
Status: DISCONTINUED | OUTPATIENT
Start: 2017-01-01 | End: 2017-01-01

## 2017-01-01 RX ORDER — INSULIN ASPART 100 [IU]/ML
1-10 INJECTION, SOLUTION INTRAVENOUS; SUBCUTANEOUS
Status: DISCONTINUED | OUTPATIENT
Start: 2017-01-01 | End: 2017-01-01

## 2017-01-01 RX ORDER — ONDANSETRON 8 MG/1
8 TABLET, ORALLY DISINTEGRATING ORAL EVERY 8 HOURS PRN
Status: DISCONTINUED | OUTPATIENT
Start: 2017-01-01 | End: 2017-01-01 | Stop reason: HOSPADM

## 2017-01-01 RX ORDER — AMOXICILLIN 250 MG
1 CAPSULE ORAL DAILY
Status: DISCONTINUED | OUTPATIENT
Start: 2017-01-01 | End: 2017-01-01

## 2017-01-01 RX ORDER — MAGNESIUM SULFATE HEPTAHYDRATE 40 MG/ML
2 INJECTION, SOLUTION INTRAVENOUS ONCE
Status: DISCONTINUED | OUTPATIENT
Start: 2017-01-01 | End: 2017-01-01

## 2017-01-01 RX ORDER — FUROSEMIDE 10 MG/ML
40 INJECTION INTRAMUSCULAR; INTRAVENOUS 2 TIMES DAILY
Status: DISCONTINUED | OUTPATIENT
Start: 2017-01-01 | End: 2017-01-01 | Stop reason: HOSPADM

## 2017-01-01 RX ORDER — ASPIRIN 81 MG/1
81 TABLET ORAL DAILY
Status: DISCONTINUED | OUTPATIENT
Start: 2017-01-01 | End: 2017-01-01

## 2017-01-01 RX ORDER — TEMAZEPAM 7.5 MG/1
CAPSULE ORAL NIGHTLY
Status: ON HOLD | COMMUNITY
End: 2017-01-01 | Stop reason: HOSPADM

## 2017-01-01 RX ORDER — MAGNESIUM SULFATE HEPTAHYDRATE 40 MG/ML
2 INJECTION, SOLUTION INTRAVENOUS ONCE
Status: COMPLETED | OUTPATIENT
Start: 2017-01-01 | End: 2017-01-01

## 2017-01-01 RX ORDER — HEPARIN SODIUM,PORCINE/D5W 25000/250
17 INTRAVENOUS SOLUTION INTRAVENOUS CONTINUOUS
Status: DISCONTINUED | OUTPATIENT
Start: 2017-01-01 | End: 2017-01-01

## 2017-01-01 RX ORDER — SODIUM CHLORIDE 0.9 % (FLUSH) 0.9 %
10 SYRINGE (ML) INJECTION EVERY 6 HOURS
Status: DISCONTINUED | OUTPATIENT
Start: 2017-01-01 | End: 2017-01-01 | Stop reason: HOSPADM

## 2017-01-01 RX ORDER — HYDRALAZINE HYDROCHLORIDE 50 MG/1
50 TABLET, FILM COATED ORAL EVERY 6 HOURS
Status: DISCONTINUED | OUTPATIENT
Start: 2017-01-01 | End: 2017-01-01

## 2017-01-01 RX ORDER — PANTOPRAZOLE SODIUM 40 MG/1
40 TABLET, DELAYED RELEASE ORAL DAILY
Status: DISCONTINUED | OUTPATIENT
Start: 2017-01-01 | End: 2017-01-01

## 2017-01-01 RX ORDER — DIAZEPAM 10 MG/1
TABLET ORAL
Refills: 0 | Status: ON HOLD | COMMUNITY
Start: 2017-01-01 | End: 2017-01-01

## 2017-01-01 RX ORDER — FLUTICASONE PROPIONATE 50 MCG
2 SPRAY, SUSPENSION (ML) NASAL DAILY
Status: DISCONTINUED | OUTPATIENT
Start: 2017-01-01 | End: 2017-01-01

## 2017-01-01 RX ORDER — HYDROMORPHONE HYDROCHLORIDE 1 MG/ML
0.5 INJECTION, SOLUTION INTRAMUSCULAR; INTRAVENOUS; SUBCUTANEOUS EVERY 6 HOURS PRN
Status: DISCONTINUED | OUTPATIENT
Start: 2017-01-01 | End: 2017-01-01 | Stop reason: HOSPADM

## 2017-01-01 RX ORDER — DIPHENHYDRAMINE HCL 50 MG
50 CAPSULE ORAL EVERY 6 HOURS
Status: DISCONTINUED | OUTPATIENT
Start: 2017-01-01 | End: 2017-01-01

## 2017-01-01 RX ORDER — LEVOTHYROXINE SODIUM 25 UG/1
25 TABLET ORAL DAILY
Status: DISCONTINUED | OUTPATIENT
Start: 2017-01-01 | End: 2017-01-01

## 2017-01-01 RX ORDER — MORPHINE SULFATE 2 MG/ML
2 INJECTION, SOLUTION INTRAMUSCULAR; INTRAVENOUS
Status: DISCONTINUED | OUTPATIENT
Start: 2017-01-01 | End: 2017-01-01

## 2017-01-01 RX ORDER — PRAVASTATIN SODIUM 40 MG/1
80 TABLET ORAL NIGHTLY
Status: DISCONTINUED | OUTPATIENT
Start: 2017-01-01 | End: 2017-01-01

## 2017-01-01 RX ORDER — METOPROLOL TARTRATE 25 MG/1
12.5 TABLET ORAL 2 TIMES DAILY
Status: DISCONTINUED | OUTPATIENT
Start: 2017-01-01 | End: 2017-01-01

## 2017-01-01 RX ORDER — VALSARTAN 80 MG/1
80 TABLET ORAL DAILY
Status: DISCONTINUED | OUTPATIENT
Start: 2017-01-01 | End: 2017-01-01

## 2017-01-01 RX ORDER — FLUTICASONE PROPIONATE 50 MCG
2 SPRAY, SUSPENSION (ML) NASAL DAILY
Qty: 1 BOTTLE | Refills: 11 | Status: ON HOLD | OUTPATIENT
Start: 2017-01-01 | End: 2017-01-01 | Stop reason: HOSPADM

## 2017-01-01 RX ORDER — MONTELUKAST SODIUM 10 MG/1
10 TABLET ORAL NIGHTLY
Qty: 30 TABLET | Refills: 11 | Status: ON HOLD | OUTPATIENT
Start: 2017-01-01 | End: 2017-01-01

## 2017-01-01 RX ORDER — SOTALOL HYDROCHLORIDE 120 MG/1
120 TABLET ORAL DAILY
Status: DISCONTINUED | OUTPATIENT
Start: 2017-01-01 | End: 2017-01-01

## 2017-01-01 RX ADMIN — FERROUS SULFATE TAB EC 325 MG (65 MG FE EQUIVALENT) 325 MG: 325 (65 FE) TABLET DELAYED RESPONSE at 08:04

## 2017-01-01 RX ADMIN — Medication 10 ML: at 11:04

## 2017-01-01 RX ADMIN — HEPARIN SODIUM AND DEXTROSE 17 UNITS/KG/HR: 10000; 5 INJECTION INTRAVENOUS at 03:04

## 2017-01-01 RX ADMIN — CLOPIDOGREL 75 MG: 75 TABLET, FILM COATED ORAL at 08:04

## 2017-01-01 RX ADMIN — PRAVASTATIN SODIUM 80 MG: 40 TABLET ORAL at 09:04

## 2017-01-01 RX ADMIN — FUROSEMIDE 40 MG: 10 INJECTION, SOLUTION INTRAMUSCULAR; INTRAVENOUS at 09:04

## 2017-01-01 RX ADMIN — LEVOTHYROXINE SODIUM 25 MCG: 25 TABLET ORAL at 09:04

## 2017-01-01 RX ADMIN — FAMOTIDINE 20 MG: 20 TABLET, FILM COATED ORAL at 08:04

## 2017-01-01 RX ADMIN — FUROSEMIDE 80 MG: 10 INJECTION, SOLUTION INTRAMUSCULAR; INTRAVENOUS at 06:04

## 2017-01-01 RX ADMIN — DIPHENHYDRAMINE HYDROCHLORIDE 50 MG: 50 CAPSULE ORAL at 03:04

## 2017-01-01 RX ADMIN — PRAVASTATIN SODIUM 80 MG: 40 TABLET ORAL at 08:04

## 2017-01-01 RX ADMIN — LEVALBUTEROL 1.25 MG: 1.25 SOLUTION, CONCENTRATE RESPIRATORY (INHALATION) at 11:04

## 2017-01-01 RX ADMIN — ACETAMINOPHEN 650 MG: 325 TABLET ORAL at 05:04

## 2017-01-01 RX ADMIN — POLYETHYLENE GLYCOL 3350 17 G: 17 POWDER, FOR SOLUTION ORAL at 06:04

## 2017-01-01 RX ADMIN — ONDANSETRON 8 MG: 8 TABLET, ORALLY DISINTEGRATING ORAL at 11:04

## 2017-01-01 RX ADMIN — LEVALBUTEROL 1.25 MG: 1.25 SOLUTION, CONCENTRATE RESPIRATORY (INHALATION) at 08:04

## 2017-01-01 RX ADMIN — HEPARIN SODIUM AND DEXTROSE 9 UNITS/KG/HR: 10000; 5 INJECTION INTRAVENOUS at 08:04

## 2017-01-01 RX ADMIN — ASPIRIN 81 MG: 81 TABLET, COATED ORAL at 09:04

## 2017-01-01 RX ADMIN — STANDARDIZED SENNA CONCENTRATE AND DOCUSATE SODIUM 1 TABLET: 8.6; 5 TABLET, FILM COATED ORAL at 08:04

## 2017-01-01 RX ADMIN — ZOLPIDEM TARTRATE 5 MG: 5 TABLET ORAL at 08:04

## 2017-01-01 RX ADMIN — ACETAMINOPHEN 650 MG: 325 TABLET ORAL at 06:04

## 2017-01-01 RX ADMIN — INSULIN ASPART 2 UNITS: 100 INJECTION, SOLUTION INTRAVENOUS; SUBCUTANEOUS at 04:04

## 2017-01-01 RX ADMIN — INSULIN ASPART 3 UNITS: 100 INJECTION, SOLUTION INTRAVENOUS; SUBCUTANEOUS at 11:04

## 2017-01-01 RX ADMIN — CLOPIDOGREL 75 MG: 75 TABLET, FILM COATED ORAL at 09:04

## 2017-01-01 RX ADMIN — DOBUTAMINE IN DEXTROSE 5 MCG/KG/MIN: 400 INJECTION, SOLUTION INTRAVENOUS at 05:04

## 2017-01-01 RX ADMIN — LEVALBUTEROL 1.25 MG: 1.25 SOLUTION, CONCENTRATE RESPIRATORY (INHALATION) at 05:04

## 2017-01-01 RX ADMIN — Medication 10 ML: at 06:04

## 2017-01-01 RX ADMIN — HYDROMORPHONE HYDROCHLORIDE 0.5 MG: 1 INJECTION, SOLUTION INTRAMUSCULAR; INTRAVENOUS; SUBCUTANEOUS at 09:04

## 2017-01-01 RX ADMIN — HYDRALAZINE HYDROCHLORIDE 25 MG: 25 TABLET, FILM COATED ORAL at 12:04

## 2017-01-01 RX ADMIN — FUROSEMIDE 80 MG: 10 INJECTION, SOLUTION INTRAMUSCULAR; INTRAVENOUS at 02:04

## 2017-01-01 RX ADMIN — Medication 10 ML: at 12:04

## 2017-01-01 RX ADMIN — HEPARIN SODIUM AND DEXTROSE 7 UNITS/KG/HR: 10000; 5 INJECTION INTRAVENOUS at 06:04

## 2017-01-01 RX ADMIN — INSULIN ASPART 4 UNITS: 100 INJECTION, SOLUTION INTRAVENOUS; SUBCUTANEOUS at 05:04

## 2017-01-01 RX ADMIN — Medication 1 CAPSULE: at 09:04

## 2017-01-01 RX ADMIN — ISOSORBIDE DINITRATE 20 MG: 10 TABLET ORAL at 06:04

## 2017-01-01 RX ADMIN — HEPARIN SODIUM AND DEXTROSE 17 UNITS/KG/HR: 10000; 5 INJECTION INTRAVENOUS at 04:04

## 2017-01-01 RX ADMIN — HYDROMORPHONE HYDROCHLORIDE 0.5 MG: 1 INJECTION, SOLUTION INTRAMUSCULAR; INTRAVENOUS; SUBCUTANEOUS at 11:04

## 2017-01-01 RX ADMIN — INSULIN ASPART 1 UNITS: 100 INJECTION, SOLUTION INTRAVENOUS; SUBCUTANEOUS at 09:04

## 2017-01-01 RX ADMIN — PANTOPRAZOLE SODIUM 40 MG: 40 TABLET, DELAYED RELEASE ORAL at 09:04

## 2017-01-01 RX ADMIN — INSULIN ASPART 4 UNITS: 100 INJECTION, SOLUTION INTRAVENOUS; SUBCUTANEOUS at 08:04

## 2017-01-01 RX ADMIN — FUROSEMIDE 15 MG/HR: 10 INJECTION, SOLUTION INTRAMUSCULAR; INTRAVENOUS at 04:04

## 2017-01-01 RX ADMIN — Medication 10 ML: at 05:04

## 2017-01-01 RX ADMIN — INSULIN ASPART 2 UNITS: 100 INJECTION, SOLUTION INTRAVENOUS; SUBCUTANEOUS at 08:04

## 2017-01-01 RX ADMIN — ISOSORBIDE DINITRATE 20 MG: 10 TABLET ORAL at 11:04

## 2017-01-01 RX ADMIN — STANDARDIZED SENNA CONCENTRATE AND DOCUSATE SODIUM 1 TABLET: 8.6; 5 TABLET, FILM COATED ORAL at 09:04

## 2017-01-01 RX ADMIN — INSULIN ASPART 0 UNITS: 100 INJECTION, SOLUTION INTRAVENOUS; SUBCUTANEOUS at 04:04

## 2017-01-01 RX ADMIN — FERROUS SULFATE TAB EC 325 MG (65 MG FE EQUIVALENT) 325 MG: 325 (65 FE) TABLET DELAYED RESPONSE at 09:04

## 2017-01-01 RX ADMIN — LIDOCAINE HYDROCHLORIDE 1 ML: 10 INJECTION INFILTRATION; PERINEURAL at 12:04

## 2017-01-01 RX ADMIN — ISOSORBIDE DINITRATE 10 MG: 10 TABLET ORAL at 11:04

## 2017-01-01 RX ADMIN — Medication 3 ML: at 02:04

## 2017-01-01 RX ADMIN — HEPARIN SODIUM AND DEXTROSE 10 UNITS/KG/HR: 10000; 5 INJECTION INTRAVENOUS at 05:04

## 2017-01-01 RX ADMIN — LEVOTHYROXINE SODIUM 25 MCG: 25 TABLET ORAL at 08:04

## 2017-01-01 RX ADMIN — HYDRALAZINE HYDROCHLORIDE 25 MG: 25 TABLET, FILM COATED ORAL at 05:04

## 2017-01-01 RX ADMIN — FUROSEMIDE 15 MG/HR: 10 INJECTION, SOLUTION INTRAMUSCULAR; INTRAVENOUS at 12:04

## 2017-01-01 RX ADMIN — DOBUTAMINE IN DEXTROSE 5 MCG/KG/MIN: 400 INJECTION, SOLUTION INTRAVENOUS at 09:04

## 2017-01-01 RX ADMIN — ASPIRIN 81 MG: 81 TABLET, COATED ORAL at 08:04

## 2017-01-01 RX ADMIN — Medication 12.5 MG: at 08:04

## 2017-01-01 RX ADMIN — MAGNESIUM SULFATE IN WATER 2 G: 40 INJECTION, SOLUTION INTRAVENOUS at 09:04

## 2017-01-01 RX ADMIN — ONDANSETRON 8 MG: 8 TABLET, ORALLY DISINTEGRATING ORAL at 07:04

## 2017-01-01 RX ADMIN — POLYETHYLENE GLYCOL 3350 17 G: 17 POWDER, FOR SOLUTION ORAL at 05:04

## 2017-01-01 RX ADMIN — HYDRALAZINE HYDROCHLORIDE 50 MG: 50 TABLET ORAL at 12:04

## 2017-01-01 RX ADMIN — Medication 100 MG: at 09:04

## 2017-01-01 RX ADMIN — INSULIN ASPART 5 UNITS: 100 INJECTION, SOLUTION INTRAVENOUS; SUBCUTANEOUS at 12:04

## 2017-01-01 RX ADMIN — INSULIN ASPART 4 UNITS: 100 INJECTION, SOLUTION INTRAVENOUS; SUBCUTANEOUS at 06:04

## 2017-01-01 RX ADMIN — LIDOCAINE HYDROCHLORIDE 1 ML: 10 INJECTION, SOLUTION INFILTRATION; PERINEURAL at 12:04

## 2017-01-01 RX ADMIN — ONDANSETRON 8 MG: 8 TABLET, ORALLY DISINTEGRATING ORAL at 05:04

## 2017-01-01 RX ADMIN — INSULIN ASPART 6 UNITS: 100 INJECTION, SOLUTION INTRAVENOUS; SUBCUTANEOUS at 10:04

## 2017-01-01 RX ADMIN — SIMETHICONE CHEW TAB 80 MG 80 MG: 80 TABLET ORAL at 02:04

## 2017-01-01 RX ADMIN — LEVALBUTEROL 1.25 MG: 1.25 SOLUTION, CONCENTRATE RESPIRATORY (INHALATION) at 04:04

## 2017-01-01 RX ADMIN — Medication 10 ML: at 08:04

## 2017-01-01 RX ADMIN — FLUTICASONE PROPIONATE 2 SPRAY: 50 SPRAY, METERED NASAL at 03:04

## 2017-01-01 RX ADMIN — VITAMIN D, TAB 1000IU (100/BT) 1000 UNITS: 25 TAB at 09:04

## 2017-01-01 RX ADMIN — HYDRALAZINE HYDROCHLORIDE 50 MG: 50 TABLET ORAL at 06:04

## 2017-01-01 RX ADMIN — INSULIN ASPART 6 UNITS: 100 INJECTION, SOLUTION INTRAVENOUS; SUBCUTANEOUS at 04:04

## 2017-01-01 RX ADMIN — INSULIN ASPART 4 UNITS: 100 INJECTION, SOLUTION INTRAVENOUS; SUBCUTANEOUS at 02:04

## 2017-01-01 RX ADMIN — HYDRALAZINE HYDROCHLORIDE 50 MG: 50 TABLET ORAL at 05:04

## 2017-01-01 RX ADMIN — FUROSEMIDE 15 MG/HR: 10 INJECTION, SOLUTION INTRAMUSCULAR; INTRAVENOUS at 01:04

## 2017-01-01 RX ADMIN — FLUTICASONE PROPIONATE 2 SPRAY: 50 SPRAY, METERED NASAL at 09:04

## 2017-01-01 RX ADMIN — PREDNISONE 50 MG: 20 TABLET ORAL at 10:04

## 2017-01-01 RX ADMIN — HYDROCODONE BITARTRATE AND ACETAMINOPHEN 1 TABLET: 5; 325 TABLET ORAL at 07:04

## 2017-01-01 RX ADMIN — PRAVASTATIN SODIUM 20 MG: 20 TABLET ORAL at 08:04

## 2017-01-01 RX ADMIN — HYDRALAZINE HYDROCHLORIDE 50 MG: 50 TABLET ORAL at 11:04

## 2017-01-01 RX ADMIN — PREDNISONE 50 MG: 20 TABLET ORAL at 03:04

## 2017-01-01 RX ADMIN — HYDROMORPHONE HYDROCHLORIDE 0.5 MG: 1 INJECTION, SOLUTION INTRAMUSCULAR; INTRAVENOUS; SUBCUTANEOUS at 03:04

## 2017-01-01 RX ADMIN — RAMELTEON 8 MG: 8 TABLET, FILM COATED ORAL at 09:04

## 2017-01-01 RX ADMIN — INSULIN ASPART 4 UNITS: 100 INJECTION, SOLUTION INTRAVENOUS; SUBCUTANEOUS at 11:04

## 2017-01-01 RX ADMIN — DULOXETINE 60 MG: 60 CAPSULE, DELAYED RELEASE ORAL at 08:04

## 2017-01-01 RX ADMIN — PANTOPRAZOLE SODIUM 40 MG: 40 TABLET, DELAYED RELEASE ORAL at 08:04

## 2017-01-01 RX ADMIN — ISOSORBIDE DINITRATE 20 MG: 10 TABLET ORAL at 05:04

## 2017-01-01 RX ADMIN — FUROSEMIDE 80 MG: 10 INJECTION, SOLUTION INTRAMUSCULAR; INTRAVENOUS at 08:04

## 2017-01-01 RX ADMIN — Medication 12.5 MG: at 09:04

## 2017-01-01 RX ADMIN — INSULIN ASPART 2 UNITS: 100 INJECTION, SOLUTION INTRAVENOUS; SUBCUTANEOUS at 09:04

## 2017-01-01 RX ADMIN — POTASSIUM CHLORIDE 40 MEQ: 400 INJECTION, SOLUTION INTRAVENOUS at 08:04

## 2017-01-01 RX ADMIN — FUROSEMIDE 80 MG: 10 INJECTION, SOLUTION INTRAMUSCULAR; INTRAVENOUS at 09:04

## 2017-01-01 RX ADMIN — ISOSORBIDE DINITRATE 20 MG: 10 TABLET ORAL at 12:04

## 2017-01-01 RX ADMIN — FUROSEMIDE 40 MG/HR: 10 INJECTION, SOLUTION INTRAMUSCULAR; INTRAVENOUS at 03:04

## 2017-01-01 RX ADMIN — ISOSORBIDE DINITRATE 10 MG: 10 TABLET ORAL at 06:04

## 2017-01-01 RX ADMIN — HYDRALAZINE HYDROCHLORIDE 25 MG: 25 TABLET, FILM COATED ORAL at 06:04

## 2017-01-01 RX ADMIN — LEVALBUTEROL 1.25 MG: 1.25 SOLUTION, CONCENTRATE RESPIRATORY (INHALATION) at 02:04

## 2017-01-01 RX ADMIN — DOBUTAMINE IN DEXTROSE 5 MCG/KG/MIN: 400 INJECTION, SOLUTION INTRAVENOUS at 01:04

## 2017-01-01 RX ADMIN — Medication 3 ML: at 06:04

## 2017-01-01 RX ADMIN — Medication 3 ML: at 08:04

## 2017-01-01 RX ADMIN — Medication 3 ML: at 12:04

## 2017-01-01 RX ADMIN — PANTOPRAZOLE SODIUM 40 MG: 40 TABLET, DELAYED RELEASE ORAL at 03:04

## 2017-01-01 RX ADMIN — HYDRALAZINE HYDROCHLORIDE 25 MG: 25 TABLET, FILM COATED ORAL at 11:04

## 2017-01-01 RX ADMIN — DULOXETINE 60 MG: 60 CAPSULE, DELAYED RELEASE ORAL at 09:04

## 2017-01-01 RX ADMIN — HEPARIN SODIUM AND DEXTROSE 11 UNITS/KG/HR: 10000; 5 INJECTION INTRAVENOUS at 11:04

## 2017-01-01 RX ADMIN — MULTIPLE VITAMINS W/ MINERALS TAB 1 TABLET: TAB at 09:04

## 2017-01-01 RX ADMIN — Medication 3 ML: at 04:04

## 2017-01-01 RX ADMIN — INSULIN ASPART 2 UNITS: 100 INJECTION, SOLUTION INTRAVENOUS; SUBCUTANEOUS at 03:04

## 2017-01-01 RX ADMIN — Medication 3 ML: at 09:04

## 2017-01-01 RX ADMIN — POLYETHYLENE GLYCOL 3350 17 G: 17 POWDER, FOR SOLUTION ORAL at 10:04

## 2017-01-01 RX ADMIN — FUROSEMIDE 80 MG: 10 INJECTION, SOLUTION INTRAMUSCULAR; INTRAVENOUS at 10:04

## 2017-01-01 RX ADMIN — FUROSEMIDE 15 MG/HR: 10 INJECTION, SOLUTION INTRAMUSCULAR; INTRAVENOUS at 11:04

## 2017-01-01 RX ADMIN — FAMOTIDINE 20 MG: 20 TABLET, FILM COATED ORAL at 07:04

## 2017-01-01 RX ADMIN — HEPARIN SODIUM AND DEXTROSE 9 UNITS/KG/HR: 10000; 5 INJECTION INTRAVENOUS at 06:04

## 2017-01-01 RX ADMIN — HEPARIN SODIUM AND DEXTROSE 15 UNITS/KG/HR: 10000; 5 INJECTION INTRAVENOUS at 02:04

## 2017-01-01 RX ADMIN — FAMOTIDINE 20 MG: 20 TABLET, FILM COATED ORAL at 03:04

## 2017-01-01 RX ADMIN — DOBUTAMINE IN DEXTROSE 5 MCG/KG/MIN: 400 INJECTION, SOLUTION INTRAVENOUS at 08:04

## 2017-01-01 RX ADMIN — ISOSORBIDE DINITRATE 10 MG: 10 TABLET ORAL at 12:04

## 2017-01-01 RX ADMIN — ISOSORBIDE DINITRATE 10 MG: 10 TABLET ORAL at 05:04

## 2017-01-01 RX ADMIN — FUROSEMIDE 40 MG: 10 INJECTION, SOLUTION INTRAMUSCULAR; INTRAVENOUS at 06:04

## 2017-01-01 RX ADMIN — HYDROMORPHONE HYDROCHLORIDE 0.5 MG: 1 INJECTION, SOLUTION INTRAMUSCULAR; INTRAVENOUS; SUBCUTANEOUS at 05:04

## 2017-01-01 RX ADMIN — ACETAMINOPHEN 650 MG: 325 TABLET ORAL at 09:04

## 2017-01-01 RX ADMIN — ONDANSETRON 8 MG: 8 TABLET, ORALLY DISINTEGRATING ORAL at 09:04

## 2017-01-01 RX ADMIN — ALUMINUM HYDROXIDE, MAGNESIUM HYDROXIDE, AND SIMETHICONE 30 ML: 200; 200; 20 SUSPENSION ORAL at 09:04

## 2017-01-01 RX ADMIN — POTASSIUM CHLORIDE 20 MEQ: 750 CAPSULE, EXTENDED RELEASE ORAL at 09:04

## 2017-01-01 RX ADMIN — DIPHENHYDRAMINE HYDROCHLORIDE 25 MG: 25 CAPSULE ORAL at 11:04

## 2017-01-01 RX ADMIN — METOLAZONE 5 MG: 5 TABLET ORAL at 02:04

## 2017-01-01 RX ADMIN — DIPHENHYDRAMINE HYDROCHLORIDE 50 MG: 50 CAPSULE ORAL at 11:04

## 2017-01-01 RX ADMIN — ACETAMINOPHEN 650 MG: 325 TABLET ORAL at 08:04

## 2017-01-01 RX ADMIN — MULTIPLE VITAMINS W/ MINERALS TAB 1 TABLET: TAB at 06:04

## 2017-02-03 NOTE — TELEPHONE ENCOUNTER
----- Message from Jaxon Carrera sent at 2/3/2017 12:04 PM CST -----  Contact: [phone]  Patient says she was established with the doctor before the doctor was in [facility]. Patient requesting an earlier appointment to see the doctor. Thanks!

## 2017-02-23 PROBLEM — R09.89 CHRONIC SINUS COMPLAINTS: Status: ACTIVE | Noted: 2017-01-01

## 2017-02-23 PROBLEM — J45.20 INTERMITTENT ASTHMA: Status: ACTIVE | Noted: 2017-01-01

## 2017-02-23 NOTE — PROGRESS NOTES
2/23/2017    Millie Thompson  Patient Seen Years Ago And Here For Evaluation    Chief Complaint   Patient presents with    Shortness of Breath    Cough    Wheezing    Bronchitis       HPI: pt seen by me yrs ago,  Here for f/u.  Had cabg x4 and abalation  At [facility] with Dr Asher, great results.  Sees Dr MANOJ Louis.    Get bronchitis sporadic with cough and wheezes, zpaks help, steroids ppt sugar elevation to 500.  zpak usually controls in week.    Anemic and needs gi f/u again.  On chr coumadin for a fib, but should be in remission.      The chief compliant  problem is varies with instablilty at time   PFSH:  Past Medical History   Diagnosis Date    Arthritis     Diabetes mellitus     History of migraine headaches      complex migrains    Hypertension     Neuropathy of foot      both feet    Thyroid disease     Unspecified sleep apnea      wears nasal cannula with 2.5 lt O2         Past Surgical History   Procedure Laterality Date    Back surgery      Hysterectomy      Rotator cuff repair       Bilateral repairs    Bilateral arthroscope of knees      Cataract extraction w/ intraocular lens  implant, bilateral       Social History   Substance Use Topics    Smoking status: Former Smoker     Years: 30.00     Types: Cigarettes     Quit date: 1985    Smokeless tobacco: None    Alcohol use No     History reviewed. No pertinent family history.  Review of patient's allergies indicates:   Allergen Reactions    Iodinated contrast media - iv dye     Lipitor [atorvastatin] Other (See Comments)    Ciprofloxacin Nausea And Vomiting       Performance Status:The patient's activity level is functions out of house.      Review of Systems:  a review of eleven systems covering constitutional, Eye, HEENT, Psych, Respiratory, Cardiac, GI, , Musculoskeletal, Endocrine, Dermatologic was negative except for pertinent findings as listed ABOVE and below: as above , posriasis, and arthritis, and dm and afib?, and  "bronchitis, and anemia. And neuropathy.    Exam:Comprehensive exam done.   Visit Vitals    BP (!) 157/68 (BP Location: Right arm, Patient Position: Sitting, BP Method: Automatic)    Pulse (!) 54    Ht 5' 3" (1.6 m)    Wt 86.2 kg (190 lb)    SpO2 (!) 94%    BMI 33.66 kg/m2     Exam included Vitals as listed, and patient's appearance and affect and alertness and mood, oral exam for yeast and hygiene and pharynx lesions and Mallapatti (M) score, neck with inspection for jvd and masses and thyroid abnormalities and lymph nodes (supraclavicular and infraclavicular nodes and axillary also examined and noted if abn), chest exam included symmetry and effort and fremitus and percussion and auscultation, cardiac exam included rhythm and gallops and murmur and rubs and jvd and edema, abdominal exam for mass and hepatosplenomegaly and tenderness and hernias and bowel sounds, Musculoskeletal exam with muscle tone and posture and mobility/gait and  strength, and skin for rashes and cyanosis and pallor and turgor, extremity for clubbing.  Findings were normal except for pertinent findings listed below:   no abn findings save psoirasis. . Right post chest around scapula sl tender.    Radiographs (ct chest and cxr) reviewed: view by direct vision  Feb 2016 nad    Labs was not done        PFT was not done     Plan:  Clinical impression is resonably certain and repeated evaluation prn +/- follow up will be needed as below.    Millie was seen today for shortness of breath, cough, wheezing and bronchitis.    Diagnoses and all orders for this visit:    Intermittent asthma, uncomplicated  -     levalbuterol (XOPENEX) 1.25 mg/3 mL nebulizer solution; Take 3 mLs (1.25 mg total) by nebulization every 4 (four) hours as needed for Wheezing or Shortness of Breath. Rescue  -     azithromycin (ZITHROMAX) 500 MG tablet; One daily for yellow mucous, repeat if needed  -     montelukast (SINGULAIR) 10 mg tablet; Take 1 tablet (10 mg total) " by mouth every evening.  -     X-Ray Chest PA And Lateral; Future  -     Complete PFT with bronchodilator; Future    Chronic sinus complaints  -     azithromycin (ZITHROMAX) 500 MG tablet; One daily for yellow mucous, repeat if needed  -     montelukast (SINGULAIR) 10 mg tablet; Take 1 tablet (10 mg total) by mouth every evening.  -     fluticasone (FLONASE) 50 mcg/actuation nasal spray; 2 sprays by Each Nare route once daily.      Return in about 6 months (around 8/23/2017).    Discussed with patient above for education the following:       If a fib resolved, may be able to stop coumadin.  Ask Dr Louis.      Chronic sinus   If flares - would use flonase 1 or 2 daily each side.   Try singulair, good for chronic sinus and prevention of bronchitis.   Use azithromycin (may repeat) if needed for infection.    Intermittent bronchitis - would consider asthma   singulair trial- may skip if no help sinus or lungs     xopenex as needed    With vague right chest/back pain would check chest xray, also breathing test.  If breathing test low (less than 70%) - would consider copd.

## 2017-02-23 NOTE — PATIENT INSTRUCTIONS
If a fib resolved, may be able to stop coumadin.  Ask Dr Louis.      Chronic sinus   If flares - would use flonase 1 or 2 daily each side.   Try singulair, good for chronic sinus and prevention of bronchitis.   Use azithromycin (may repeat) if needed for infection.    Intermittent bronchitis - would consider asthma   singulair trial- may skip if no help sinus or lungs     xopenex as needed    With vague right chest/back pain would check chest xray, also breathing test.  If breathing test low (less than 70%) - would consider copd.

## 2017-02-23 NOTE — MR AVS SNAPSHOT
[facility]  [address]  [place] [address]  Phone: [phone]                  Millie Thompson   2017 1:40 PM   Office Visit    Description:  Female : 1935   Provider:  Hipolito Blanc MD   Department:  [facility]           Reason for Visit     Shortness of Breath     Cough     Wheezing     Bronchitis           Diagnoses this Visit        Comments    Intermittent asthma, uncomplicated         Chronic sinus complaints                To Do List           Future Appointments        Provider Department Dept Phone    2017 8:20 AM MD [facility] [phone]      Goals (5 Years of Data)     None      Follow-Up and Disposition     Return in about 6 months (around 2017).    Follow-up and Disposition History       These Medications        Disp Refills Start End    levalbuterol (XOPENEX) 1.25 mg/3 mL nebulizer solution 90 mL 5 2017    Take 3 mLs (1.25 mg total) by nebulization every 4 (four) hours as needed for Wheezing or Shortness of Breath. Rescue - Nebulization    Pharmacy: [facility] Ph #: [phone]       azithromycin (ZITHROMAX) 500 MG tablet 3 tablet 3 2017     One daily for yellow mucous, repeat if needed    Pharmacy: [facility] Ph #: [phone]       montelukast (SINGULAIR) 10 mg tablet 30 tablet 11 2017     Take 1 tablet (10 mg total) by mouth every evening. - Oral    Pharmacy: [facility] Ph #: [phone]       fluticasone (FLONASE) 50 mcg/actuation nasal spray 1 Bottle 11 2017     2 sprays by Each Nare route once daily. - Each Nare    Pharmacy: [facility] Ph #: [phone]         [facility]     [facility] -  Assistance  Registered nurses in the [facility] Center provide  clinical advisement, health education, appointment booking, and other advisory services.  Call for this free service at [phone].             Medications           Message regarding Medications     Verify the changes and/or additions to your medication regime listed below are the same as discussed with your clinician today.  If any of these changes or additions are incorrect, please notify your healthcare provider.        START taking these NEW medications        Refills    levalbuterol (XOPENEX) 1.25 mg/3 mL nebulizer solution 5    Sig: Take 3 mLs (1.25 mg total) by nebulization every 4 (four) hours as needed for Wheezing or Shortness of Breath. Rescue    Class: Normal    Route: Nebulization    azithromycin (ZITHROMAX) 500 MG tablet 3    Sig: One daily for yellow mucous, repeat if needed    Class: Normal    montelukast (SINGULAIR) 10 mg tablet 11    Sig: Take 1 tablet (10 mg total) by mouth every evening.    Class: Normal    Route: Oral    fluticasone (FLONASE) 50 mcg/actuation nasal spray 11    Si sprays by Each Nare route once daily.    Class: Normal    Route: Each Nare      STOP taking these medications     atorvastatin (LIPITOR) 20 MG tablet Take 20 mg by mouth once daily.      aspirin (BUFFERIN) 325 MG buffered tablet Take 650 mg by mouth once daily.      ciclopirox (PENLAC) 8 % Soln Apply topically nightly.    cilostazol (PLETAL) 100 MG Tab     citalopram (CELEXA) 20 MG tablet Take 20 mg by mouth once daily.      clonidine (CATAPRES) 0.1 MG tablet Take 0.1 mg by mouth as needed.      estradiol (ESTRACE) 1 MG tablet Take 1 mg by mouth once daily.      glimepiride (AMARYL) 4 MG tablet Take 4 mg by mouth every 12 (twelve) hours.      hydrocodone-acetaminophen (VICODIN) 5-500 mg per tablet Take 1 tablet by mouth every 6 (six) hours as needed. States she only takes 1/2 tablet  Once daily     metoprolol tartrate (LOPRESSOR) 100 MG tablet Take 100 mg by mouth 2 (two) times daily.      salicylic acid  "(SALACYN) 6 % Crea Apply 1 application topically 2 (two) times daily.    temazepam (RESTORIL) 22.5 MG capsule Take 30 mg by mouth nightly as needed.             Verify that the below list of medications is an accurate representation of the medications you are currently taking.  If none reported, the list may be blank. If incorrect, please contact your healthcare provider. Carry this list with you in case of emergency.           Current Medications     amlodipine (NORVASC) 5 MG tablet     clobetasol 0.05% (TEMOVATE) 0.05 % Oint     diazePAM (VALIUM) 10 MG Tab     duloxetine (CYMBALTA) 60 MG capsule     furosemide (LASIX) 20 MG tablet Take 20 mg by mouth once daily.      levalbuterol (XOPENEX) 0.63 mg/3 mL nebulizer solution     levothyroxine (SYNTHROID) 25 MCG tablet Take 25 mcg by mouth once daily.      metformin (GLUCOPHAGE) 500 MG tablet Take 500 mg by mouth 2 (two) times daily with meals.      NOVOFINE 32 32 gauge x 1/4" Ndle     NOVOLOG MIX 70-30 FLEXPEN 100 unit/mL (70-30) InPn pen     omeprazole (PRILOSEC) 20 MG capsule Take 20 mg by mouth once daily.      pravastatin (PRAVACHOL) 20 MG tablet     sotalol (BETAPACE) 120 MG Tab     valsartan (DIOVAN) 80 MG tablet     warfarin (COUMADIN) 5 MG tablet 7.5 mg.     azithromycin (ZITHROMAX) 500 MG tablet One daily for yellow mucous, repeat if needed    fluticasone (FLONASE) 50 mcg/actuation nasal spray 2 sprays by Each Nare route once daily.    levalbuterol (XOPENEX) 1.25 mg/3 mL nebulizer solution Take 3 mLs (1.25 mg total) by nebulization every 4 (four) hours as needed for Wheezing or Shortness of Breath. Rescue    montelukast (SINGULAIR) 10 mg tablet Take 1 tablet (10 mg total) by mouth every evening.    mupirocin (BACTROBAN) 2 % ointment            Clinical Reference Information           Your Vitals Were     BP Pulse Height Weight SpO2 BMI    157/68 (BP Location: Right arm, Patient Position: Sitting, BP Method: Automatic) 54 5' 3" (1.6 m) 86.2 kg (190 lb) 94% " 33.66 kg/m2      Blood Pressure          Most Recent Value    BP  (!)  157/68      Allergies as of 2/23/2017     Iodinated Contrast Media - Iv Dye    Lipitor [Atorvastatin]    Ciprofloxacin      Immunizations Administered on Date of Encounter - 2/23/2017     None      Orders Placed During Today's Visit      Normal Orders This Visit    Complete PFT with bronchodilator     X-Ray Chest PA And Lateral     Future Labs/Procedures Expected by Expires    X-Ray Chest PA And Lateral  2/23/2017 2/23/2018    X-Ray Chest PA And Lateral  2/23/2017 2/23/2018    Complete PFT with bronchodilator  As directed 2/23/2018      Instructions    If a fib resolved, may be able to stop coumadin.  Ask Dr Louis.      Chronic sinus   If flares - would use flonase 1 or 2 daily each side.   Try singulair, good for chronic sinus and prevention of bronchitis.   Use azithromycin (may repeat) if needed for infection.    Intermittent bronchitis - would consider asthma   singulair trial- may skip if no help sinus or lungs     xopenex as needed    With vague right chest/back pain would check chest xray, also breathing test.  If breathing test low (less than 70%) - would consider copd.       Language Assistance Services     ATTENTION: Language assistance services are available, free of charge. Please call [phone].      ATENCIÓN: Si habla darrionañol, tiene a fu disposición servicios gratuitos de asistencia lingüística. Llame al [phone].     [facility] Ý: N?u b?n nói Ti?ng Vi?t, có các d?ch v? h? tr? ngôn ng? mi?n phí dành cho b?n. G?i s? [phone].         [facility] [place] [facility] complies with applicable Federal civil rights laws and does not discriminate on the basis of race, color, national origin, age, disability, or sex.

## 2017-04-17 PROBLEM — K21.9 GERD (GASTROESOPHAGEAL REFLUX DISEASE): Status: ACTIVE | Noted: 2017-01-01

## 2017-04-17 PROBLEM — E11.9 DM TYPE 2 (DIABETES MELLITUS, TYPE 2): Status: ACTIVE | Noted: 2017-01-01

## 2017-04-17 PROBLEM — M19.90 OSTEOARTHRITIS: Status: ACTIVE | Noted: 2017-01-01

## 2017-04-17 PROBLEM — I21.09 ACUTE ANTERIOR WALL MI: Status: ACTIVE | Noted: 2017-01-01

## 2017-04-17 PROBLEM — I25.810 CORONARY ARTERY DISEASE INVOLVING CORONARY BYPASS GRAFT OF NATIVE HEART WITHOUT ANGINA PECTORIS: Status: ACTIVE | Noted: 2017-01-01

## 2017-04-17 PROBLEM — K76.1 CHRONIC PASSIVE CONGESTION OF LIVER: Status: ACTIVE | Noted: 2017-01-01

## 2017-04-17 PROBLEM — G43.909 MIGRAINES: Status: ACTIVE | Noted: 2017-01-01

## 2017-04-17 PROBLEM — I48.0 PAROXYSMAL A-FIB: Status: ACTIVE | Noted: 2017-01-01

## 2017-04-17 PROBLEM — G47.33 OSA (OBSTRUCTIVE SLEEP APNEA): Status: ACTIVE | Noted: 2017-01-01

## 2017-04-17 PROBLEM — I10 ESSENTIAL HYPERTENSION: Status: ACTIVE | Noted: 2017-01-01

## 2017-04-17 PROBLEM — E03.4 HYPOTHYROIDISM DUE TO ACQUIRED ATROPHY OF THYROID: Status: ACTIVE | Noted: 2017-01-01

## 2017-04-17 PROBLEM — D64.9 ANEMIA: Status: ACTIVE | Noted: 2017-01-01

## 2017-04-17 PROBLEM — E11.42 DIABETIC POLYNEUROPATHY: Status: ACTIVE | Noted: 2017-01-01

## 2017-04-17 PROBLEM — Q21.0 VSD (VENTRICULAR SEPTAL DEFECT): Status: ACTIVE | Noted: 2017-01-01

## 2017-04-17 NOTE — H&P
[facility] Hospital Medicine  History & Physical    Patient Name: Millie Thompson  MRN: [id number]  Admission Date: 4/17/2017  Attending Physician: Dr. Kiley Mejia MD  Primary Care Provider: Alonzo Jean MD    [facility] Medicine Team: [facility] HOSP MED J Salima Dorantes NP     Patient information was obtained from patient, relative(s), past medical records and ER records.     Subjective:     Principal Problem:VSD (ventricular septal defect)    Chief Complaint: No chief complaint on file.       HPI: Ms. Tohmpson is an 81 y.o. female with past medical history of CAD s/p NSTEMI 2015 s/p 3V CABG (SVG-LAD, SVG Y graft 1st diagonal and RCA), HTN, HLD, PAF on coumadin, COPD, CVA without deficit 2012, anemia transferred from [facility] for further evaluation and treatment of muscular VSD s/p MI. Patient underwent outpatient colonoscopy 3/28/17 for evaluation of anemia (which by report was not very notable), experienced neck pain post procedure, went to ER 3/30/17 due to dyspnea and was given a steroid injection and discharged from ER. Family reports patient did not have EKG at that time. Dyspnea and neck pain worsened prompting presentation again to the ED on 4/3/17 at which time she had new LBBB and troponinemia upwards of 9. [facility] performed 4/7/17 which showed occluded vein graft to LAD, akenetic apex with apical muscular VSD. Echo 4/11/17 showed EF 30-35%, LVEDD 5 cm, normal RV, PAS 60-70 mmHg, mild to moderate AS. There was questionable hx of apical thrombus per report but this was not noted on echo report? Patient was evaluated by CTS who recommended 6-8 week cooling off period before they would operate. Patient transferred to [facility] for eval of percutaneous repair.     She denies any chest pain or palpitations. She does endorse shortness of breath with minimal exertion and mild BLE swelling. She denies N/V/D. Endorses decreased appetite. Denies fever or chills. Endorses night sweats and  "intermittent diaphoresis with dyspneic episodes since colonoscopy in March.     She was admitted to the Hospital Medicine Service for further management.        Past Medical History:   Diagnosis Date    Arthritis     Diabetes mellitus     History of migraine headaches     complex migrains    Hypertension     Neuropathy of foot     both feet    Thyroid disease     Unspecified sleep apnea     wears nasal cannula with 2.5 lt O2       Past Surgical History:   Procedure Laterality Date    BACK SURGERY      bilateral arthroscope of knees      CATARACT EXTRACTION W/ INTRAOCULAR LENS  IMPLANT, BILATERAL      HYSTERECTOMY      ROTATOR CUFF REPAIR      Bilateral repairs       Review of patient's allergies indicates:   Allergen Reactions    Iodinated contrast media - oral and iv dye     Lipitor [atorvastatin] Other (See Comments)    Ciprofloxacin Nausea And Vomiting       No current facility-administered medications on file prior to encounter.      Current Outpatient Prescriptions on File Prior to Encounter   Medication Sig    duloxetine (CYMBALTA) 60 MG capsule     levothyroxine (SYNTHROID) 25 MCG tablet Take 25 mcg by mouth once daily.      amlodipine (NORVASC) 5 MG tablet     clobetasol 0.05% (TEMOVATE) 0.05 % Oint     fluticasone (FLONASE) 50 mcg/actuation nasal spray 2 sprays by Each Nare route once daily.    furosemide (LASIX) 20 MG tablet Take 20 mg by mouth once daily.      levalbuterol (XOPENEX) 0.63 mg/3 mL nebulizer solution     levalbuterol (XOPENEX) 1.25 mg/3 mL nebulizer solution Take 3 mLs (1.25 mg total) by nebulization every 4 (four) hours as needed for Wheezing or Shortness of Breath. Rescue    metformin (GLUCOPHAGE) 500 MG tablet Take 500 mg by mouth 2 (two) times daily with meals.      mupirocin (BACTROBAN) 2 % ointment     NOVOFINE 32 32 gauge x 1/4" Ndle     NOVOLOG MIX 70-30 FLEXPEN 100 unit/mL (70-30) InPn pen     omeprazole (PRILOSEC) 20 MG capsule Take 20 mg by mouth once " daily.      pravastatin (PRAVACHOL) 20 MG tablet     sotalol (BETAPACE) 120 MG Tab     valsartan (DIOVAN) 80 MG tablet     warfarin (COUMADIN) 5 MG tablet 7.5 mg.     [DISCONTINUED] azithromycin (ZITHROMAX) 500 MG tablet One daily for yellow mucous, repeat if needed    [DISCONTINUED] diazePAM (VALIUM) 10 MG Tab     [DISCONTINUED] montelukast (SINGULAIR) 10 mg tablet Take 1 tablet (10 mg total) by mouth every evening.     Family History     None        Social History Main Topics    Smoking status: Former Smoker     Years: 30.00     Types: Cigarettes     Quit date: 1985    Smokeless tobacco: Not on file    Alcohol use No    Drug use: No    Sexual activity: Not on file     Review of Systems   Constitutional: Positive for activity change, diaphoresis and fatigue. Negative for chills, fever and unexpected weight change.   HENT: Negative for congestion, sinus pressure and trouble swallowing.    Eyes: Negative.    Respiratory: Positive for shortness of breath. Negative for cough, chest tightness and wheezing.    Cardiovascular: Positive for leg swelling. Negative for chest pain and palpitations.   Gastrointestinal: Positive for constipation (due to iron supplementation ). Negative for abdominal distention, abdominal pain, blood in stool, diarrhea, nausea and vomiting.   Endocrine: Negative.    Genitourinary: Negative for decreased urine volume and difficulty urinating.   Musculoskeletal: Negative.    Skin: Negative.    Allergic/Immunologic: Negative.    Neurological: Negative for dizziness, syncope, light-headedness and headaches.   Hematological: Negative.    Psychiatric/Behavioral: Negative.      Objective:     Vital Signs (Most Recent):  Temp: 98.2 °F (36.8 °C) (04/17/17 1100)  Pulse: 69 (04/17/17 1500)  Resp: 18 (04/17/17 1100)  BP: 113/74 (04/17/17 1100)  SpO2: 99 % (04/17/17 1100) Vital Signs (24h Range):  Temp:  [98.2 °F (36.8 °C)] 98.2 °F (36.8 °C)  Pulse:  [65-69] 69  Resp:  [18] 18  SpO2:  [99 %] 99  %  BP: (113)/(74) 113/74        There is no height or weight on file to calculate BMI.    Physical Exam   Constitutional: She is oriented to person, place, and time. She appears well-developed and well-nourished. No distress.   HENT:   Head: Normocephalic and atraumatic.   Eyes: Pupils are equal, round, and reactive to light.   Neck: JVD present.   Cardiovascular: Normal rate, regular rhythm, normal heart sounds and intact distal pulses.  Exam reveals no gallop and no friction rub.    No murmur heard.  Pulmonary/Chest: Effort normal. She has decreased breath sounds in the right lower field and the left lower field. She has no wheezes. She exhibits no tenderness.   Abdominal: Soft. Bowel sounds are normal. She exhibits no distension. There is no tenderness.   Musculoskeletal: She exhibits no edema.   Neurological: She is alert and oriented to person, place, and time.   Skin: Skin is warm and dry. No rash noted. She is not diaphoretic. There is pallor.   Nursing note and vitals reviewed.       Significant Labs:   A1C: No results for input(s): HGBA1C in the last 4320 hours.  BMP:   Recent Labs  Lab 04/17/17  1401   *      K 4.3   CL 98   CO2 28   BUN 41*   CREATININE 1.5*   CALCIUM 9.3   MG 1.8     CBC:   Recent Labs  Lab 04/17/17  1401   WBC 8.17   HGB 10.4*   HCT 33.6*        CMP:   Recent Labs  Lab 04/17/17  1401      K 4.3   CL 98   CO2 28   *   BUN 41*   CREATININE 1.5*   CALCIUM 9.3   PROT 6.7   ALBUMIN 2.8*   BILITOT 1.2*   ALKPHOS 116   AST 76*   *   ANIONGAP 10   EGFRNONAA 32.7*     Cardiac Markers:   Recent Labs  Lab 04/17/17  1401   BNP 2688*     Lactic Acid:   Recent Labs  Lab 04/17/17  1401   LACTATE 2.0     Lipid Panel:   Recent Labs  Lab 04/17/17  1401   CHOL 119*   HDL 19*   LDLCALC 87.0   TRIG 65   CHOLHDL 16.0*     All pertinent labs within the past 24 hours have been reviewed.    Significant Imaging: I have reviewed all pertinent imaging results/findings within  the past 24 hours.   EKG: NSR w/ PVC's  2D echo: with EF 20%, moderate MR, moderate TR, moderate AS, PASP 64.25, and diastolic dysfunction  CXR: pending    Assessment/Plan:     * VSD (ventricular septal defect)  -s/p MI in early April, transferred here from [facility] for evaluation of possible percutaneous closure of muscular VSD  -2D echo 4/17 with EF 20%, moderate MR, moderate TR, moderate AS, PASP 64.25, and diastolic dysfunction, muscular VSD at apex, multiple wall motion abnormalities   -echo at OSH 4/11 with EF 35%, mild MR, moderate TR, severely elevated PA pressures, PASP estimated 79, dialted IVC, elevated RA pressure  -interventional cardiology consulted  -CTS consulted  -BP stable at this time, will adjust medications as needed   -at time of discharge from [place] she was receiving: sotalol 40 mg Q12, losartan 50 mg QD, coreg 3.125 mg BID  -volume overloaded on exam, + JVD and shortness of breath with conversation, BNP 2688: will add furosemide 40 mg BID >>> BUN 41, SCr 1.5 on admission, monitor   -continue telemetry monitoring  -low threshold to transfer to ICU if any instability    Acute anterior wall MI  -currently denies chest pain  -LHC at [place] 4/7 with 80-90% LM stenosis, 95% ostial and total occlusion proximal LAD, 70% stenosis of proximal RCA, patent saphenous vein graft to RCA, patent saphenous vein graft w/ Y to 1st to diag and 2nd obtuse, 50% stenosis of saphenous vein great to obtuse marginal, total occlusion of saphenous vein graft to LAD, large apical akinetic segment, apical VSD  -continue asa and statin  -interventional cardiology and CTS consulted, will await recommendations    Coronary artery disease involving coronary bypass graft of native heart without angina pectoris  -continue ASA and statin   -on heparin gtt for sub therapeutic INR in setting of Hx pAFIB    Essential hypertension  -BP wnl now  -at time of discharge from [place] she was receiving: sotalol 40 mg Q12,  losartan 50 mg QD, coreg 3.125 mg BID: will hold all for now in setting of normotensive and NSR    Paroxysmal a-fib  -?? ablation during CABG procedure in 2015  -denies any further episodes of Afib since  -on warfarin 7.5 mg at home, will hold now in setting of potential surgical procedure  -PT/INR 13.7/1.3 on admission   -initiate heparin gtt without bolus    DM type 2 (diabetes mellitus, type 2)  -begin low dose SSI  -will monitor CBG    Anemia  -H/H stable, will monitor  -send anemia profile and retic count    Hypothyroidism due to acquired atrophy of thyroid  -continue home synthroid  -check TSH, T4 in am    Chronic passive congestion of liver  -AST 76, ALT 1855, TBili 1.2   -these are improved from [place] admission  -continue to monitor    MARY (obstructive sleep apnea)  -wears oxygen at home PRN 2.5 liters at night  -no CPAP  -consider ambulatory referral to sleep medicine nearer to discharge    GERD  -continue protonix    VTE Risk Mitigation     None        Salima Dorantes NP  Department of Hospital Medicine   [facility]  [employer] [phone]  Pager [phone]

## 2017-04-17 NOTE — ASSESSMENT & PLAN NOTE
-s/p MI in early April, transferred here from [facility] for evaluation of possible percutaneous closure of muscular VSD  -2D echo 4/17 with EF 20%, moderate MR, moderate TR, moderate AS, PASP 64.25, and diastolic dysfunction, muscular VSD at apex, multiple wall motion abnormalities   -echo at OSH 4/11 with EF 35%, mild MR, moderate TR, severely elevated PA pressures, PASP estimated 79, dialted IVC, elevated RA pressure  -interventional cardiology consulted  -CTS consulted  -BP stable at this time, will adjust medications as needed   -at time of discharge from [place] she was receiving: sotalol 40 mg Q12, losartan 50 mg QD, coreg 3.125 mg BID  -volume overloaded on exam, + JVD and shortness of breath with conversation, BNP 2688: will add furosemide 40 mg BID >>> BUN 41, SCr 1.5 on admission, monitor   -continue telemetry monitoring  -low threshold to transfer to ICU if any instability

## 2017-04-17 NOTE — CONSULTS
Consult Note    Inpatient consult to Cardiothoracic Surgery  Consult performed by: MARY BUCKNER  Consult ordered by: JEISON GOOD  Reason for consult: VSD repair         SUBJECTIVE:     History of Present Illness:  Patient is a 81 y.o. female presents with history of CAD s/p NSTEMI 2015 s/p 3V CABG (SVG-LAD, SVG Y graft 1st diagonal and RCA), HTN, HLD, PAF on coumadin, COPD, CVA without deficit 2012, anemia transferred from [place] for further evaluation and treatment of muscular VSD s/p MI. Patient underwnet outpatient colonoscopy 3/28/17 for evaluation of anemia (which by report was not very notable), experienced neck pain post procedure, went to ER 3/30/17 given dyspnea and was discharged from ER.  Dyspnea and neck pain worsened prompting presentation again 4/3/17 at which time she had new LBBB and troponinemia upwards of 9. C performed 4/7/17 which showed occluded vein graft to LAD, akenetic apex with apical muscular VSD. Echo 4/11/17 showed EF 30-35%, LVEDD 5 cm, normal RV, PAS 60-70 mmHg, mild to moderate AS (1.38 cm2 with MG 11 mmHg. There was questionable hx of apical thrombus per report but this was not noted on echo report. Patient was evaluated by CTS who recommended 6-8 week cooling off period before they would operate at [place].       Review of patient's allergies indicates:   Allergen Reactions    Iodinated contrast media - oral and iv dye     Lipitor [atorvastatin] Other (See Comments)    Ciprofloxacin Nausea And Vomiting     Past Medical History:   Diagnosis Date    Arthritis     Diabetes mellitus     History of migraine headaches     complex migrains    Hypertension     Neuropathy of foot     both feet    Thyroid disease     Unspecified sleep apnea     wears nasal cannula with 2.5 lt O2     Past Surgical History:   Procedure Laterality Date    BACK SURGERY      bilateral arthroscope of knees      CATARACT EXTRACTION W/ INTRAOCULAR LENS  IMPLANT, BILATERAL      HYSTERECTOMY       ROTATOR CUFF REPAIR      Bilateral repairs     History reviewed. No pertinent family history.  Social History   Substance Use Topics    Smoking status: Former Smoker     Years: 30.00     Types: Cigarettes     Quit date: 1985    Smokeless tobacco: None    Alcohol use No     Review of Systems   Constitutional: Negative for fever and malaise/fatigue.   Respiratory: Negative for cough and shortness of breath.    Cardiovascular: Negative for chest pain, palpitations, claudication and leg swelling.   Gastrointestinal: Negative for abdominal pain, nausea and vomiting.   Genitourinary: Negative for dysuria.   Musculoskeletal: Negative for falls.   Skin: Negative for rash.   Neurological: Negative for dizziness, seizures and headaches.   Endo/Heme/Allergies: Negative for polydipsia.     OBJECTIVE:     Vital Signs:  Temp:  [98.2 °F (36.8 °C)]   Pulse:  [65-69]   Resp:  [18]   BP: (113)/(74)   SpO2:  [99 %]     Physical Exam   Constitutional: She is oriented to person, place, and time. She appears well-developed and well-nourished.   HENT:   Head: Normocephalic.   Eyes: Pupils are equal, round, and reactive to light.   Neck: JVD present.   Cardiovascular: Normal rate and regular rhythm.    Murmur heard.  Pulmonary/Chest: Effort normal.   Fine crackles    Abdominal: Soft.   Musculoskeletal: Normal range of motion.   Neurological: She is alert and oriented to person, place, and time.   Skin: Skin is warm and dry.   Psychiatric: She has a normal mood and affect.     Laboratory:  CBC:   Recent Labs  Lab 04/17/17  1401   WBC 8.17   RBC 4.11   HGB 10.4*   HCT 33.6*      MCV 82   MCH 25.3*   MCHC 31.0*     BMP:   Recent Labs  Lab 04/17/17  1401   *      K 4.3   CL 98   CO2 28   BUN 41*   CREATININE 1.5*   CALCIUM 9.3   MG 1.8     Coagulation:   Recent Labs  Lab 04/17/17  1401   INR 1.3*   APTT 27.1     Diagnostic Results:  Pending ECHO    ASSESSMENT/PLAN:   Patient is a 81 y.o. female presents with history of  CAD s/p NSTEMI 2015 s/p 3V CABG (SVG-LAD, SVG Y graft 1st diagonal and RCA), HTN, HLD, PAF on coumadin, COPD, CVA without deficit 2012, anemia transferred from [place] for further evaluation and treatment of muscular VSD s/p MI.     Plan: Pt is not a surgical candidate due to multiple comorbidites, redo sternotomy and age. The team agrees with consult to interventional Cardiology for possible percutaneous VSD repair.

## 2017-04-17 NOTE — ASSESSMENT & PLAN NOTE
-BP wnl now  -at time of discharge from [place] she was receiving: sotalol 40 mg Q12, losartan 50 mg QD, coreg 3.125 mg BID: will hold all for now in setting of normotensive and NSR

## 2017-04-17 NOTE — PLAN OF CARE
Outside Transfer Acceptance Note    Transferring Physician or Mid Level Provider/Speciality: Dr. Washington Louis    Accepting Physician: Kiley Mejia     Date of Acceptance: 04/17/2017     Transferring Facility/Hospital: [facility]    Reason for Transfer to [facility]: Large anterior wall MI with VSD requiring closure    Report from Transferring Physician or Mid-Level provider/Hospital course:   An 81-year-old woman with ACD, NSTEMI in 2015, s/p CABG, HTN, HLD, paroxysmal AFib on Coumadin therapy, Stroke without deficit, anemia, intermittent asthma admitted to [facility] for large anterior MI and subsequent VSD. Patient in need of transfer for management of VSD. Discussed with Dr. Savage (Cardiology) who accepted patient for transfer for possible VSD repair. Hospital course at OSH complicated by congestive hepatopathy and DEBORAH.     To do list upon patient arrival:    -Consult Cardiology Co-Management service   -Dr. Savage accepted patient for transfer   -Management of CHF   -Monitor liver enzymes and creatinine    Please call extension [phone] upon patient arrival to floor for Hospital Medicine admit team assignment and for additional admit orders. If patient is coming from another [facility] facility please also call [phone] to inform the admit team/office that patient has arrived from the [facility] facility to the floor so patient can be evaluated.

## 2017-04-17 NOTE — CONSULTS
Cardiology Consult Note  Attending Physician: Kiley Mejia MD  Reason for Consult: VSD    HPI:   81 y.o. woman with history of CAD s/p NSTEMI 2015 s/p 3V CABG (SVG-LAD, SVG Y graft 1st diagonal and RCA), HTN, HLD, PAF on coumadin, COPD, CVA without deficit 2012, anemia transferred from [place] for further evaluation and treatment of muscular VSD s/p MI. Patient underwnet outpatient colonoscopy 3/28/17 for evaluation of anemia (which by report was not very notable), experienced neck pain post procedure, went to ER 3/30/17 given dyspnea and was discharged from ER. Family reports patient did not have EKG. Dyspnea and neck pain worsened prompting presentation again 4/3/17 at which time she had new LBBB and troponinemia upwards of 9. LHC performed 4/7/17 which showed occluded vein graft to LAD, akenetic apex with apical muscular VSD. Echo 4/11/17 showed EF 30-35%, LVEDD 5 cm, normal RV, PAS 60-70 mmHg, mild to moderate AS (1.38 cm2 with MG 11 mmHg. There was questionable hx of apical thrombus per report but this was not noted on echo report? Patient was evaluated by CTS who recommended 6-8 week cooling off period before they would operate. Patient transferred for eval of percutaneous repair.     ROS:    Constitution: negative for - fever, chills, weight loss or weight gain  HENT: negative for - sore throat, rhinorrhea, or headache  Eyes: negative for - blurred or double vision  Cardiovascular: positive for - shortness of breath  Pulmonary: negative for - cough, sputum changes or wheezing  Gastrointestinal: negative for - abdominal pain, nausea, vomiting, or diarrhea  : negative for - dysuria  Neurological: negative for - focal weakness or sensory changes  PMH:     Past Medical History:   Diagnosis Date    Arthritis     Diabetes mellitus     History of migraine headaches     complex migrains    Hypertension     Neuropathy of foot     both feet    Thyroid disease     Unspecified sleep apnea     wears  "nasal cannula with 2.5 lt O2     Past Surgical History:   Procedure Laterality Date    BACK SURGERY      bilateral arthroscope of knees      CATARACT EXTRACTION W/ INTRAOCULAR LENS  IMPLANT, BILATERAL      HYSTERECTOMY      ROTATOR CUFF REPAIR      Bilateral repairs     Allergies:     Review of patient's allergies indicates:   Allergen Reactions    Iodinated contrast media - oral and iv dye     Lipitor [atorvastatin] Other (See Comments)    Ciprofloxacin Nausea And Vomiting     Medications:     No current facility-administered medications on file prior to encounter.      Current Outpatient Prescriptions on File Prior to Encounter   Medication Sig Dispense Refill    duloxetine (CYMBALTA) 60 MG capsule       levothyroxine (SYNTHROID) 25 MCG tablet Take 25 mcg by mouth once daily.        amlodipine (NORVASC) 5 MG tablet       clobetasol 0.05% (TEMOVATE) 0.05 % Oint   1    fluticasone (FLONASE) 50 mcg/actuation nasal spray 2 sprays by Each Nare route once daily. 1 Bottle 11    furosemide (LASIX) 20 MG tablet Take 20 mg by mouth once daily.        levalbuterol (XOPENEX) 0.63 mg/3 mL nebulizer solution   3    levalbuterol (XOPENEX) 1.25 mg/3 mL nebulizer solution Take 3 mLs (1.25 mg total) by nebulization every 4 (four) hours as needed for Wheezing or Shortness of Breath. Rescue 90 mL 5    metformin (GLUCOPHAGE) 500 MG tablet Take 500 mg by mouth 2 (two) times daily with meals.        mupirocin (BACTROBAN) 2 % ointment       NOVOFINE 32 32 gauge x 1/4" Ndle       NOVOLOG MIX 70-30 FLEXPEN 100 unit/mL (70-30) InPn pen       omeprazole (PRILOSEC) 20 MG capsule Take 20 mg by mouth once daily.        pravastatin (PRAVACHOL) 20 MG tablet       sotalol (BETAPACE) 120 MG Tab       valsartan (DIOVAN) 80 MG tablet       warfarin (COUMADIN) 5 MG tablet 7.5 mg.       [DISCONTINUED] azithromycin (ZITHROMAX) 500 MG tablet One daily for yellow mucous, repeat if needed 3 tablet 3    [DISCONTINUED] diazePAM " (VALIUM) 10 MG Tab   0    [DISCONTINUED] montelukast (SINGULAIR) 10 mg tablet Take 1 tablet (10 mg total) by mouth every evening. 30 tablet 11     Social History:     Social History   Substance Use Topics    Smoking status: Former Smoker     Years: 30.00     Types: Cigarettes     Quit date: 1985    Smokeless tobacco: Not on file    Alcohol use No     Family History:   History reviewed. No pertinent family history.  Physical Exam:     Vitals:  Temp:  [98.2 °F (36.8 °C)]   Pulse:  [65]   Resp:  [18]   BP: (113)/(74)   SpO2:  [99 %]  on RA I/O's:  No intake or output data in the 24 hours ending 04/17/17 1358     Constitutional: NAD, conversant  HEENT: Sclera anicteric, PERRLA, EOMI  Neck: 12-14 cm JVD, no carotid bruits  CV: RRR, 3/6 JOSE ANTONIO, normal S1/S2  Pulm: Bibasilar crackles  GI: Abdomen soft, NTND, +BS  Extremities: Trace LE edema, warm and well perfused  Skin: No ecchymosis, erythema, or ulcers  Psych: AOx3, appropriate affect  Neuro: CNII-XII intact, no focal deficits    Labs:     Labs pending  Imaging:   No results found for: EF    EKG: Pending    Assessment:   81 y.o. woman with history of CAD s/p NSTEMI 2015 s/p 3V CABG (SVG-LAD, SVG Y graft 1st diagonal and RCA), HTN, HLD, PAF on coumadin, COPD, CVA without deficit 2012, anemia transferred from [place] for further evaluation and treatment of muscular VSD s/p MI. Echo suggests high PA pressures 60-70 mm Hg. Transferred for eval of repair. She has stable BP and does not appear to be in shock at this time.     Plan:   - Interventional Cardiology and CTS consults (orders placed)  - Low threshold to transfer to ICU if any instability  - Labs  - Echo  - EKG  - Address goals of care -- has DNR in chart  - Would be cautious resuming sotalol without known renal function   - Stop amlodipine  - Unless patient's been receiving Valsartan 80 mg at OSH,would also be judicious about resuming at home dose  - Follow-up INR , would bridge with heparin if subtherapeutic  -  Appears volume overloaded given JVD on exam -> send BNP, likely needs continued diuresis    Will follow. Further recs pending above information. Will review echo with Dr. Savage once uploaded.     Signed:    Edwin Reyez MD  Cardiology Fellow, PGY-6  Pager: [phone]  4/17/2017 1:58 PM

## 2017-04-17 NOTE — ASSESSMENT & PLAN NOTE
-wears oxygen at home PRN 2.5 liters at night  -no CPAP  -consider ambulatory referral to sleep medicine nearer to discharge

## 2017-04-17 NOTE — SUBJECTIVE & OBJECTIVE
"Past Medical History:   Diagnosis Date    Arthritis     Diabetes mellitus     History of migraine headaches     complex migrains    Hypertension     Neuropathy of foot     both feet    Thyroid disease     Unspecified sleep apnea     wears nasal cannula with 2.5 lt O2       Past Surgical History:   Procedure Laterality Date    BACK SURGERY      bilateral arthroscope of knees      CATARACT EXTRACTION W/ INTRAOCULAR LENS  IMPLANT, BILATERAL      HYSTERECTOMY      ROTATOR CUFF REPAIR      Bilateral repairs       Review of patient's allergies indicates:   Allergen Reactions    Iodinated contrast media - oral and iv dye     Lipitor [atorvastatin] Other (See Comments)    Ciprofloxacin Nausea And Vomiting       No current facility-administered medications on file prior to encounter.      Current Outpatient Prescriptions on File Prior to Encounter   Medication Sig    duloxetine (CYMBALTA) 60 MG capsule     levothyroxine (SYNTHROID) 25 MCG tablet Take 25 mcg by mouth once daily.      amlodipine (NORVASC) 5 MG tablet     clobetasol 0.05% (TEMOVATE) 0.05 % Oint     fluticasone (FLONASE) 50 mcg/actuation nasal spray 2 sprays by Each Nare route once daily.    furosemide (LASIX) 20 MG tablet Take 20 mg by mouth once daily.      levalbuterol (XOPENEX) 0.63 mg/3 mL nebulizer solution     levalbuterol (XOPENEX) 1.25 mg/3 mL nebulizer solution Take 3 mLs (1.25 mg total) by nebulization every 4 (four) hours as needed for Wheezing or Shortness of Breath. Rescue    metformin (GLUCOPHAGE) 500 MG tablet Take 500 mg by mouth 2 (two) times daily with meals.      mupirocin (BACTROBAN) 2 % ointment     NOVOFINE 32 32 gauge x 1/4" Ndle     NOVOLOG MIX 70-30 FLEXPEN 100 unit/mL (70-30) InPn pen     omeprazole (PRILOSEC) 20 MG capsule Take 20 mg by mouth once daily.      pravastatin (PRAVACHOL) 20 MG tablet     sotalol (BETAPACE) 120 MG Tab     valsartan (DIOVAN) 80 MG tablet     warfarin (COUMADIN) 5 MG tablet 7.5 " mg.     [DISCONTINUED] azithromycin (ZITHROMAX) 500 MG tablet One daily for yellow mucous, repeat if needed    [DISCONTINUED] diazePAM (VALIUM) 10 MG Tab     [DISCONTINUED] montelukast (SINGULAIR) 10 mg tablet Take 1 tablet (10 mg total) by mouth every evening.     Family History     None        Social History Main Topics    Smoking status: Former Smoker     Years: 30.00     Types: Cigarettes     Quit date: 1985    Smokeless tobacco: Not on file    Alcohol use No    Drug use: No    Sexual activity: Not on file     Review of Systems   Constitutional: Positive for activity change, diaphoresis and fatigue. Negative for chills, fever and unexpected weight change.   HENT: Negative for congestion, sinus pressure and trouble swallowing.    Eyes: Negative.    Respiratory: Positive for shortness of breath. Negative for cough, chest tightness and wheezing.    Cardiovascular: Positive for leg swelling. Negative for chest pain and palpitations.   Gastrointestinal: Positive for constipation (due to iron supplementation ). Negative for abdominal distention, abdominal pain, blood in stool, diarrhea, nausea and vomiting.   Endocrine: Negative.    Genitourinary: Negative for decreased urine volume and difficulty urinating.   Musculoskeletal: Negative.    Skin: Negative.    Allergic/Immunologic: Negative.    Neurological: Negative for dizziness, syncope, light-headedness and headaches.   Hematological: Negative.    Psychiatric/Behavioral: Negative.      Objective:     Vital Signs (Most Recent):  Temp: 98.2 °F (36.8 °C) (04/17/17 1100)  Pulse: 69 (04/17/17 1500)  Resp: 18 (04/17/17 1100)  BP: 113/74 (04/17/17 1100)  SpO2: 99 % (04/17/17 1100) Vital Signs (24h Range):  Temp:  [98.2 °F (36.8 °C)] 98.2 °F (36.8 °C)  Pulse:  [65-69] 69  Resp:  [18] 18  SpO2:  [99 %] 99 %  BP: (113)/(74) 113/74        There is no height or weight on file to calculate BMI.    Physical Exam   Constitutional: She is oriented to person, place, and  time. She appears well-developed and well-nourished. No distress.   HENT:   Head: Normocephalic and atraumatic.   Eyes: Pupils are equal, round, and reactive to light.   Neck: JVD present.   Cardiovascular: Normal rate, regular rhythm, normal heart sounds and intact distal pulses.  Exam reveals no gallop and no friction rub.    No murmur heard.  Pulmonary/Chest: Effort normal. She has decreased breath sounds in the right lower field and the left lower field. She has no wheezes. She exhibits no tenderness.   Abdominal: Soft. Bowel sounds are normal. She exhibits no distension. There is no tenderness.   Musculoskeletal: She exhibits no edema.   Neurological: She is alert and oriented to person, place, and time.   Skin: Skin is warm and dry. No rash noted. She is not diaphoretic. There is pallor.   Nursing note and vitals reviewed.       Significant Labs:   A1C: No results for input(s): HGBA1C in the last 4320 hours.  BMP:   Recent Labs  Lab 04/17/17  1401   *      K 4.3   CL 98   CO2 28   BUN 41*   CREATININE 1.5*   CALCIUM 9.3   MG 1.8     CBC:   Recent Labs  Lab 04/17/17  1401   WBC 8.17   HGB 10.4*   HCT 33.6*        CMP:   Recent Labs  Lab 04/17/17  1401      K 4.3   CL 98   CO2 28   *   BUN 41*   CREATININE 1.5*   CALCIUM 9.3   PROT 6.7   ALBUMIN 2.8*   BILITOT 1.2*   ALKPHOS 116   AST 76*   *   ANIONGAP 10   EGFRNONAA 32.7*     Cardiac Markers:   Recent Labs  Lab 04/17/17  1401   BNP 2688*     Lactic Acid:   Recent Labs  Lab 04/17/17  1401   LACTATE 2.0     Lipid Panel:   Recent Labs  Lab 04/17/17  1401   CHOL 119*   HDL 19*   LDLCALC 87.0   TRIG 65   CHOLHDL 16.0*     All pertinent labs within the past 24 hours have been reviewed.    Significant Imaging: I have reviewed all pertinent imaging results/findings within the past 24 hours.   EKG: NSR w/ PVC's  2D echo: with EF 20%, moderate MR, moderate TR, moderate AS, PASP 64.25, and diastolic dysfunction  CXR: pending

## 2017-04-17 NOTE — IP AVS SNAPSHOT
[facility]  [address]  [facility] [address]  Phone: [phone]           Patient Discharge Instructions   Our goal is to set you up for success. This packet includes information on your condition, medications, and your home care.  It will help you care for yourself to prevent having to return to the hospital.     Please ask your nurse if you have any questions.      There are many details to remember when preparing to leave the hospital. Here is what you will need to do:    1. Take your medicine. If you are prescribed medications, review your Medication List on the following pages. You may have new medications to  at the pharmacy and others that you'll need to stop taking. Review the instructions for how and when to take your medications. Talk with your doctor or nurses if you are unsure of what to do.     2. Go to your follow-up appointments. Specific follow-up information is listed in the following pages. Your may be contacted by a nurse or clinical provider about future appointments. Be sure we have all of the phone numbers to reach you. Please contact your provider's office if you are unable to make an appointment.     3. Watch for warning signs. Your doctor or nurse will give you detailed warning signs to watch for and when to call for assistance. These instructions may also include educational information about your condition. If you experience any of warning signs to your health, call your doctor.           [facility]  Unless otherwise directed by your provider, please   contact [facility], our nurse care line   that is available for 24/7 assistance.     [phone] (toll-free)     Registered nurses in the [facility]   provide: appointment scheduling, clinical advisement, health education, and other advisory services.                  ** Verify the list of medication(s) below is accurate and up to date. Carry this with you in case of  "emergency. If your medications have changed, please notify your healthcare provider.             Medication List      CONTINUE taking these medications        Additional Info                      NOVOFINE 32 32 gauge x 1/4" Ndle   Refills:  0   Generic drug:  pen needle, diabetic      Begin Date    AM    Noon    PM    Bedtime       NOVOLOG MIX 70-30 FLEXPEN 100 unit/mL (70-30) Inpn pen   Refills:  0   Dose:  18 Units   Generic drug:  insulin aspart protamine-insulin aspart    Instructions:  Inject 18 Units into the skin 2 (two) times daily.     Begin Date    AM    Noon    PM    Bedtime       omeprazole 20 MG capsule   Commonly known as:  PRILOSEC   Refills:  0   Dose:  20 mg    Instructions:  Take 20 mg by mouth once daily.     Begin Date    AM    Noon    PM    Bedtime         STOP taking these medications     amlodipine 5 MG tablet   Commonly known as:  NORVASC       duloxetine 60 MG capsule   Commonly known as:  CYMBALTA       fluticasone 50 mcg/actuation nasal spray   Commonly known as:  FLONASE       furosemide 20 MG tablet   Commonly known as:  LASIX       levalbuterol 1.25 mg/3 mL nebulizer solution   Commonly known as:  XOPENEX       levothyroxine 25 MCG tablet   Commonly known as:  SYNTHROID       pravastatin 20 MG tablet   Commonly known as:  PRAVACHOL       sotalol 120 MG Tab   Commonly known as:  BETAPACE       temazepam 7.5 MG Cap   Commonly known as:  RESTORIL       valsartan 80 MG tablet   Commonly known as:  DIOVAN       warfarin 5 MG tablet   Commonly known as:  COUMADIN                  Please bring to all follow up appointments:    1. A copy of your discharge instructions.  2. All medicines you are currently taking in their original bottles.  3. Identification and insurance card.    Please arrive 15 minutes ahead of scheduled appointment time.    Please call 24 hours in advance if you must reschedule your appointment and/or time.        Your Scheduled Appointments     Jul 25, 2017  8:20 AM CDT " "  Established Patient Visit with Hipolito Blanc MD   [facility] ([facility] [place] [facility])    [address]  [place] [address] [phone]                  Primary Diagnosis     Your primary diagnosis was:  Heart Attack      Admission Information     Date & Time Provider Department CSN    4/17/2017 10:53 AM Janessa Eaton MD [facility] [id number]      Care Providers     Provider Role Specialty Primary office phone    Janessa Eaton MD Attending Provider Cardiology [phone]    Erick Savage MD Consulting Physician  Cardiology [phone]    Mandie Man MD Physician  Cardiology [phone]    Norm Hdz Jr., MD Physician  Cardiology [phone]    Patrick Quinones MD Physician  Cardiology [phone]    Janessa Eaton MD Physician  Cardiology [phone]    Jess Girard MD Physician  Cardiology [phone]    Ashwin Feliciano MD Physician  Cardiology [phone]    Patrick Quinones MD Consulting Physician  Cardiology [phone]    Janessa Eaton MD Team Attending  Cardiology [phone]    Jess Girard MD Team Attending  Cardiology [phone]    Ashwin Feliciano MD Team Attending  Cardiology [phone]    Mandie Man MD Team Attending  Cardiology [phone]      Your Vitals Were     BP Pulse Temp Resp Height Weight    103/67 (BP Location: Left arm, Patient Position: Lying, BP Method: cNIBP) 115 98.1 °F (36.7 °C) (Oral) 14 5' 3" (1.6 m) 94.6 kg (208 lb 8.9 oz)    SpO2 BMI             97% 36.94 kg/m2         Recent Lab Values        4/17/2017                           2:01 PM           A1C 7.7 (H)           Comment for A1C at  2:01 PM on 4/17/2017:  According to ADA guidelines, hemoglobin A1C <7.0% represents  optimal control in non-pregnant diabetic patients.  Different  metrics may apply to specific populations.   Standards of Medical Care in Diabetes - 2016.  For the purpose of screening for the presence of " diabetes:  <5.7%     Consistent with the absence of diabetes  5.7-6.4%  Consistent with increasing risk for diabetes   (prediabetes)  >or=6.5%  Consistent with diabetes  Currently no consensus exists for use of hemoglobin A1C  for diagnosis of diabetes for children.        Allergies as of 4/24/2017        Reactions    Iodinated Contrast Media - Oral And Iv Dye     Lipitor [Atorvastatin] Other (See Comments)    Ciprofloxacin Nausea And Vomiting      Advance Directives     An advance directive is a document which, in the event you are no longer able to make decisions for yourself, tells your healthcare team what kind of treatment you do or do not want to receive, or who you would like to make those decisions for you.  If you do not currently have an advance directive, [facility] encourages you to create one.  For more information call:  [phone]), [phone] ([phone]),  or log on to [email or web address].        Smoking Cessation     If you would like to quit smoking:   You may be eligible for free services if you are a [place] resident and started smoking cigarettes before September 1, 1988.  Call the Smoking Cessation Trust ([facility]) toll free at (988) 925-2649 or (395) [phone].   Call 6-800-QUIT-NOW if you do not meet the above criteria.   Contact us via email: [email or web address]   View our website for more information: [email or web address]        Language Assistance Services     ATTENTION: Language assistance services are available, free of charge. Please call [phone].      ATENCIÓN: Si habla español, tiene a fu disposición servicios gratuitos de asistencia lingüística. Llame al [phone].     CHÚ Ý: N?u b?n nói Ti?ng Vi?t, có các d?ch v? h? tr? ngôn ng? mi?n phí dành cho b?n. G?i s? [phone].        Heart Failure Education       Heart Failure: Being Active  You have a condition called heart failure. Being active doesnt mean that you have to wear yourself  out. Even a little movement each day helps to strengthen your heart. If you cant get out to exercise, you can do simple stretching and strengthening exercises at home. These are good ways to keep you well-conditioned and prevent you and your heart from becoming excessively weak.    Ideas to get you started  · Add a little movement to things you do now. Walk to mail letters. Park your car at the far end of the parking lot and walk to the store. Walk up a flight of stairs instead of taking the elevator.  · Choose activities you enjoy. You might walk, swim, or ride an exercise bike. Things like gardening and washing the car count, too. Other possibilities include: washing dishes, walking the dog, walking around the mall, and doing aerobic activities with friends.  · Join a group exercise program at a [facility], a senior center, or a community center. Or look into a hospital cardiac rehabilitation program. Ask your doctor if you qualify.  Tips to keep you going  · Get up and get dressed each day. Go to a coffee shop and read a newspaper or go somewhere that you'll be in the presence of other active people. Youll feel more like being active.  · Make a plan. Choose one or more activities that you enjoy and that you can easily do. Then plan to do at least one each day. You might write your plan on a calendar.  · Go with a friend or a group if you like company. This can help you feel supported and stay motivated, too.  · Plan social events that you enjoy. This will keep you mentally engaged as well as physically motivated to do things you find pleasure in.  For your safety  · Talk with your healthcare provider before starting an exercise program.  · Exercise indoors when its too hot or too cold outside, or when the air quality is poor. Try walking at a shopping mall.  · Wear socks and sturdy shoes to maintain your balance and prevent falls.  · Start slowly. Do a few minutes several times a day at first. Increase your  time and speed little by little.  · Stop and rest whenever you feel tired or get short of breath.  · Dont push yourself on days when you dont feel well.  Date Last Reviewed: 3/20/2016  © 7757-7698 [employer]. [address] [place] [address]. All rights reserved. This information is not intended as a substitute for professional medical care. Always follow your healthcare professional's instructions.              Heart Failure: Evaluating Your Heart  You have a condition called heart failure. To evaluate your condition, your doctor will examine you, ask questions, and do some tests. Along with looking for signs of heart failure, the doctor looks for any other health problems that may have led to heart failure. The results of your evaluation will help your doctor form a treatment plan.  Health history and physical exam  Your visit will start with a health history. Tell the doctor about any symptoms youve noticed and about all medicines you take. Then youll have a physical exam. This includes listening to your heartbeat and breathing. Youll also be checked for swelling (edema) in your legs and neck. When you have fluid buildup or fluid in the lungs, it may be called congestive heart failure.  Diagnosing heart failure     During an echocardiogram, sound waves bounce off the heart. These are converted into a picture on the screen.   The following may be done to help your doctor form a diagnosis:  · X-rays show the size and shape of your heart. These pictures can also show fluid in your lungs.  · An electrocardiogram (ECG or EKG) shows the pattern of your heartbeat. Small pads (electrodes) are placed on your chest, arms, and legs. Wires connect the pads to the ECG machine, which records your hearts electrical signals. This can give the doctor information about heart function.  · An echocardiogram uses ultrasound waves to show the structure and movement of your heart muscle. This shows how  well the heart pumps. It also shows the thickness of the heart walls, and if the heart is enlarged. It is one of the most useful, non-invasive tests as it provides information about the heart's general function. This helps your doctor make treatment decisions.  · Lab tests evaluate small amounts of blood or urine for signs of problems. A BNP lab test can help diagnose and evaluate heart failure. BNP stands for B-type natriuretic peptide. The ventricles secrete more BNP when heart failure worsens. Lab tests can also provide information about metabolic dysfunction or heart dysfunction.  Your treatment plan  Based on the results of your evaluation and tests, your doctor will develop a treatment plan. This plan is designed to relieve some of your heart failure symptoms and help make you more comfortable. Your treatment plan may include:  · Medicine to help your heart work better and improve your quality of life  · Changes in what you eat and drink to help prevent fluid from backing up in your body  · Daily monitoring of your weight and heart failure symptoms to see how well your treatment plan is working  · Exercise to help you stay healthy  · Help with quitting smoking  · Emotional and psychological support to help adjust to the changes  · Referrals to other specialists to make sure you are being treated comprehensively  Date Last Reviewed: 3/21/2016  © 6238-4040 [employer]. [address] [place] [address], [place] [address]. All rights reserved. This information is not intended as a substitute for professional medical care. Always follow your healthcare professional's instructions.              Heart Failure: Making Changes to Your Diet  You have a condition called heart failure. When you have heart failure, excess fluid is more likely to build up in your body because your heart isn't working well. This makes the heart work harder to pump blood. Fluid buildup causes symptoms such as shortness of breath and  swelling (edema). This is often referred to as congestive heart failure or CHF. Controlling the amount of salt (sodium) you eat may help stop fluid from building up. Your doctor may also tell you to reduce the amount of fluid you drink.  Reading food labels    Your healthcare provider will tell you how much sodium you can eat each day. Read food labels to keep track. Keep in mind that certain foods are high in salt. These include canned, frozen, and processed foods. Check the amount of sodium in each serving. Watch out for high-sodium ingredients. These include MSG (monosodium glutamate), baking soda, and sodium phosphate.   Eating less salt  Give yourself time to get used to eating less salt. It may take a little while. Here are some tips to help:  · Take the saltshaker off the table. Replace it with salt-free herb mixes and spices.  · Eat fresh or plain frozen vegetables. These have much less salt than canned vegetables.  · Choose low-sodium snacks like sodium-free pretzels, crackers, or air-popped popcorn.  · Dont add salt to your food when youre cooking. Instead, season your foods with pepper, lemon, garlic, or onion.  · When you eat out, ask that your food be cooked without added salt.  · Avoid eating fried foods as these often have a great deal of salt.  If youre told to limit fluids  You may need to limit how much fluid you have to help prevent swelling. This includes anything that is liquid at room temperature, such as ice cream and soup. If your doctor tells you to limit fluid, try these tips:  · Measure drinks in a measuring cup before you drink them. This will help you meet daily goals.  · Chill drinks to make them more refreshing.  · Suck on frozen lemon wedges to quench thirst.  · Only drink when youre thirsty.  · Chew sugarless gum or suck on hard candy to keep your mouth moist.  · Weigh yourself daily to know if your body's fluid content is rising.  My sodium goal  Your healthcare provider may  give you a sodium goal to meet each day. This includes sodium found in food as well as salt that you add. My goal is to eat no more than ___________ mg of sodium per day.     When to call your doctor  Call your doctor right away if you have any symptoms of worsening heart failure. These can include:  · Sudden weight gain  · Increased swelling of your legs or ankles  · Trouble breathing when youre resting or at night  · Increase in the number of pillows you have to sleep on  · Chest pain, pressure, discomfort, or pain in the jaw, neck, or back   Date Last Reviewed: 3/21/2016  © 2826-4256 [employer]. [address] [place] [address], [place] [address]. All rights reserved. This information is not intended as a substitute for professional medical care. Always follow your healthcare professional's instructions.              Heart Failure: Medicines to Help Your Heart    You have a condition called heart failure (also known as congestive heart failure, or CHF). Your doctor will likely prescribe medicines for heart failure and any underlying health problems you have. Most heart failure patients take one or more types of medicinen. Your healthcare provider will work to find the combination of medicines that works best for you.  Heart failure medicines  Here are the most common heart failure medicines:  · ACE inhibitors lower blood pressure and decrease strain on the heart. This makes it easier for the heart to pump. Angiotensin receptor blockers have similar effects. These are prescribed for some patients instead of ACE inhibitors.  · Beta-blockers relieve stress on the heart. They also improve symptoms. They may also improve the heart's pumping action over time.  · Diuretics (also called water pills) help rid your body of excess water. This can help rid your body of swelling (edema). Having less fluid to pump means your heart doesnt have to work as hard. Some diuretics make your body lose a mineral called  potassium. Your doctor will tell you if you need to take supplements or eat more foods high in potassium.  · Digoxin helps your heart pump with more strength. This helps your heart pump more blood with each beat. So, more oxygen-rich blood travels to the rest of the body.  · Aldosterone antagonists help alter hormones and decrease strain on the heart.  · Hydralazine and nitrates are two separate medicines used together to treat heart failure. They may come in one combination pill. They lower blood pressure and decrease how hard the heart has to pump.  Medicines for related conditions  Controlling other heart problems helps keep heart failure under control, too. Depending on other heart problems you have, medicines may be prescribed to:  · Lower blood pressure (antihypertensives).  · Lower cholesterol levels (statins).  · Prevent blood clots (anticoagulants or aspirin).  · Keep the heartbeat steady (antiarrhythmics).  Date Last Reviewed: 3/5/2016  © 3982-8267 [employer]. [address] [place] [address]. All rights reserved. This information is not intended as a substitute for professional medical care. Always follow your healthcare professional's instructions.              Heart Failure: Procedures That May Help    The heart is a muscle that pumps oxygen-rich blood to all parts of the body. When you have heart failure, the heart is not able to pump as well as it should. Blood and fluid may back up into the lungs (congestive heart failure), and some parts of the body dont get enough oxygen-rich blood to work normally. These problems lead to the symptoms of heart failure.     Certain procedures may help the heart pump better in some cases of heart failure. Some procedures are done to treat health problems that may have caused the heart failure such as coronary artery disease or heart rhythm problems. For more serious heart failure, other options are available.  Treating artery and valve  problems  If you have coronary artery disease or valve disease, procedures may be done to improve blood flow. This helps the heart pump better, which can improve heart failure symptoms. First, your doctor may do a cardiac catheterization to help detect clogged blood vessels or valve damage. During this procedure, a  thin tube (catheter) in inserted into a blood vessel and guided to the heart. There a dye is injected and a special type of X-ray (angiogram) is taken of the blood vessels. Procedures to open a blocked artery or fix damaged valves can also be done using catheterization.  · Angioplasty uses a balloon-tipped instrument at the end of the catheter. The balloon is inflated to widen the narrowed artery. In many cases, a stent is expanded to further support the narrowed artery. A stent is a metal mesh tube.  · Valve surgery repairs or replacement of faulty valves can also be done during catheterization so blood can flow properly through the chambers of the heart.  Bypass surgery is another option to help treat blocked arteries. It uses a healthy blood vessel from elsewhere in the body. The healthy blood vessel is attached above and below the blocked area so that blood can flow around the blocked artery.  Treating heart rhythm problems  A device may be placed in the chest to help a weak heart maintain a healthy, heartbeat so the heart can pump more effectively:  · Pacemaker. A pacemaker is an implanted device that regulates your heartbeat electronically. It monitors your heart's rhythm and generates a painless electric impulse that helps the heart beat in a regular rhythm. A pacemaker is programmed to meet your specific heart rhythm needs.  · Biventricular pacing/cardiac resynchronization therapy. A type of pacemaker that paces both pumping chambers of the heart at the same time to coordinate contractions and to improve the heart's function. Some people with heart failure are candidates for this  therapy.  · Implantable cardioverter defibrillator. A device similar to a pacemaker that senses when the heart is beating too fast and delivers an electrical shock to convert the fast rhythm to a normal rhythm. This can be a life saving device.  In severe cases  In more serious cases of heart failure when other treatments no longer work, other options may include:  · Ventricular assist devices (VADs). These are mechanical devices used to take over the pumping function for one or both of the heart's ventricles, or pumping chambers. A VAD may be necessary when heart failure progresses to the point that medicines and other treatments no longer help. In some cases, a VAD may be used as a bridge to transplant.  · Heart transplant. This is replacing the diseased heart with a healthy one from a donor. This is an option for a few people who are very sick. A heart transplant is very serious and not an option for all patients. Your doctor can tell you more.  Date Last Reviewed: 3/20/2016  © 9153-9490 [employer]. [address] [place] [address], [place] [address]. All rights reserved. This information is not intended as a substitute for professional medical care. Always follow your healthcare professional's instructions.              Heart Failure: Tracking Your Weight  You have a condition called heart failure. When you have heart failure, a sudden weight gain or a steady rise in weight is a warning sign that your body is retaining too much water and salt. This could mean your heart failure is getting worse. If left untreated, it can cause problems for your lungs and result in shortness of breath. Weighing yourself each day is the best way to know if youre retaining water. If your weight goes up quickly, call your doctor. You will be given instructions on how to get rid of the excess water. You will likely need medicines and to avoid salt. This will help your heart work better.  Call your doctor if you gain more than 2  pounds in 1 day, more than 5 pounds in 1 week, or whatever weight gain you were told to report by your doctor. This is often a sign of worsening heart failure and needs to be evaluated and treated. Your doctor will tell you what to do next.   Tips for weighing yourself    · Weigh yourself at the same time each morning, wearing the same clothes. Weigh yourself after urinating and before eating.  · Use the same scale each day. Make sure the numbers are easy to read. Put the scale on a flat, hard surface -- not on a rug or carpet.  · Do not stop weighing yourself. If you forget one day, weigh again the next morning.  How to use your weight chart  · Keep your weight chart near the scale. Write your weight on the chart as soon as you get off the scale.  · Fill in the month and the start date on the chart. Then write down your weight each day. Your chart will look like this:    · If you miss a day, leave the space blank. Weigh yourself the next day and write your weight in the next space.  · Take your weight chart with you when you go to see your doctor.  Date Last Reviewed: 3/20/2016  © 6710-9811 [employer]. [address] [place] [address], [place] [address]. All rights reserved. This information is not intended as a substitute for professional medical care. Always follow your healthcare professional's instructions.              Heart Failure: Warning Signs of a Flare-Up  You have a condition called heart failure. Once you have heart failure, flare-ups can happen. Below are signs that can mean your heart failure is getting worse. If you notice any of these warning signs, call your healthcare provider.  Swelling    · Your feet, ankles, or lower legs get puffier.  · You notice skin changes on your lower legs.  · Your shoes feel too tight.  · Your clothes are tighter in the waist.  · You have trouble getting rings on or off your fingers.  Shortness of breath  · You have to breathe harder even when youre doing your  normal activities or when youre resting.  · You are short of breath walking up stairs or even short distances.  · You wake up at night short of breath or coughing.  · You need to use more pillows or sit up to sleep.  · You wake up tired or restless.  Other warning signs  · You feel weaker, dizzy, or more tired.  · You have chest pain or changes in your heartbeat.  · You have a cough that wont go away.  · You cant remember things or dont feel like eating.  Tracking your weight  Gaining weight is often the first warning sign that heart failure is getting worse. Gaining even a few pounds can be a sign that your body is retaining excess water and salt. Weighing yourself each day in the morning after you urinate and before you eat, is the best way to know if you're retaining water. Get a scale that is easy to read and make sure you wear the same clothes and use the same scale every time you weigh. Your healthcare provider will show you how to track your weight. Call your doctor if you gain more than 2 pounds in 1 day, 5 pounds in 1 week, or whatever weight gain you were told to report by your doctor. This is often a sign of worsening heart failure and needs to be evaluated and treated before it compromises your breathing. Your doctor will tell you what to do next.    Date Last Reviewed: 3/15/2016  © 1509-7547 [employer]. [address] [place] [address], [place] [address]. All rights reserved. This information is not intended as a substitute for professional medical care. Always follow your healthcare professional's instructions.              Diabetes Discharge Instructions                                    [facility] complies with applicable Federal civil rights laws and does not discriminate on the basis of race, color, national origin, age, disability, or sex.

## 2017-04-17 NOTE — PROGRESS NOTES
Patient identified via spelling of name and date of birth. Patient denies blood transfusion reaction. Consent obtained for use of contrast. Optison 3ml IVP vorirais Dominique. 22 gauge saline lock flushed pre and post useunder aseptic technique. Optison 3ml IVP used as contrast for 2 d imaging. Tolerated procedure well.

## 2017-04-17 NOTE — ASSESSMENT & PLAN NOTE
-currently denies chest pain  -LHC at [place] 4/7 with 80-90% LM stenosis, 95% ostial and total occlusion proximal LAD, 70% stenosis of proximal RCA, patent saphenous vein graft to RCA, patent saphenous vein graft w/ Y to 1st to diag and 2nd obtuse, 50% stenosis of saphenous vein great to obtuse marginal, total occlusion of saphenous vein graft to LAD, large apical akinetic segment, apical VSD  -continue asa and statin  -interventional cardiology and CTS consulted, will await recommendations

## 2017-04-17 NOTE — ASSESSMENT & PLAN NOTE
-?? ablation during CABG procedure in 2015  -denies any further episodes of Afib since  -on warfarin 7.5 mg at home, will hold now in setting of potential surgical procedure  -PT/INR 13.7/1.3 on admission   -initiate heparin gtt without bolus

## 2017-04-18 NOTE — PLAN OF CARE
Problem: Patient Care Overview  Goal: Plan of Care Review  Outcome: Ongoing (interventions implemented as appropriate)  Pt c/o tingling in her BLE, and chest heaviness associated with intermittent SOB and wheeziness.  Respiratory treatments helping. Pt maintained oxygen saturation >94%. Lungs are very diminished with some crackles and intermittent wheezing,  Pt also c/o anxiety.  VS and assessment per flow sheet, patient progressing towards goals as tolerated, plan of care reviewed with Millie Thompson and family, all concerns addressed, will continue to monitor.        Problem: Fall Risk (Adult)  Goal: Absence of Falls  Patient will demonstrate the desired outcomes by discharge/transition of care.   Outcome: Ongoing (interventions implemented as appropriate)  Pt remains free of injury and falls. All personal items within reach, call bell in reach, bed is lowest locked position, Non skid socks used while out of bed. Room free of clutter. Pt turned as tolerated.    Problem: Diabetes, Type 2 (Adult)  Goal: Signs and Symptoms of Listed Potential Problems Will be Absent, Minimized or Managed (Diabetes, Type 2)  Signs and symptoms of listed potential problems will be absent, minimized or managed by discharge/transition of care (reference Diabetes, Type 2 (Adult) CPG).   Outcome: Ongoing (interventions implemented as appropriate)  Pt blood glucose monitored ACHS. Insulin provided as needed.         Problem: Pressure Ulcer Risk (Butch Scale) (Adult,Obstetrics,Pediatric)  Goal: Skin Integrity  Patient will demonstrate the desired outcomes by discharge/transition of care.   Outcome: Ongoing (interventions implemented as appropriate)  Pt encouraged to turn frequently.  Diaper removed and pad utilized instead.

## 2017-04-18 NOTE — PROGRESS NOTES
[facility]  Adult Nutrition  Progress Note                                            Assessment and Plan    Coronary artery disease involving coronary bypass graft of native heart without angina pectoris    Educated Pt. On heart healthy diet 4/18/17    Arthur García RD

## 2017-04-18 NOTE — PLAN OF CARE
"Met with pt and daughters at . Pt sees Ghazal Louis and Gabe. PCP is Dr. Alonzo Jean. Pt is independent w ADLs but has a cane, wheelchair, and RW.  Not current w hh at present time.   Has a portable oxygen tank and concentrator at home.  Pt was tx from [place]. Pt states that she had "no problems until she had a colonoscopy."     04/18/17 0932   Discharge Assessment   Assessment Type Discharge Planning Assessment   Confirmed/corrected address and phone number on facesheet? Yes   Assessment information obtained from? Patient   Expected Length of Stay (days) 4   Communicated expected length of stay with patient/caregiver yes   Prior to hospitilization cognitive status: Alert/Oriented;No Deficits   Prior to hospitalization functional status: Independent;Assistive Equipment   Current cognitive status: Alert/Oriented;No Deficits   Current Functional Status: Independent;Assistive Equipment   Able to Return to Prior Arrangements yes   Is patient able to care for self after discharge? No   How many people do you have in your home that can help with your care after discharge? 2   Who are your caregiver(s) and their phone number(s)? has two daughters in room  [facility]  [phone]    Patient's perception of discharge disposition admitted as an inpatient   Patient currently being followed by outpatient case management? No   Patient currently receives home health services? No   Does the patient currently use HME? Yes   Patient currently receives private duty nursing? N/A   Patient currently receives any other outside agency services? No   Equipment Currently Used at Home walker, rolling;wheelchair;cane, straight   Do you have any problems affording any of your prescribed medications? No   Is the patient taking medications as prescribed? yes   Do you have any financial concerns preventing you from receiving the healthcare you need? No   Does the patient have transportation to healthcare appointments? Yes "   Transportation Available family or friend will provide   On Dialysis? No   Does the patient receive services at the Coumadin Clinic? No   Are there any open cases? No   Discharge Plan A Home with family;Home Health   Discharge Plan B Skilled Nursing Facility   Patient/Family In Agreement With Plan yes

## 2017-04-18 NOTE — PROGRESS NOTES
Progress Note  Cardiology Co-Management Service    Admit Date: 4/17/2017   LOS: 1 day     SUBJECTIVE:     Follow up for: VSD (ventricular septal defect)    Interval History:   Patient remained clinically stable and without acute events last night. Echo confirmed VSD and low EF. CTS deferred surgery given re-do surgery and associated co-morbidities. Pending interventional cardiology evaluation for percutaneous repair.       Scheduled Meds:   aspirin  81 mg Oral Daily    coenzyme Q10  1 capsule Oral Daily    duloxetine  60 mg Oral QHS    ferrous sulfate  325 mg Oral BID    fluticasone  2 spray Each Nare Daily    furosemide  40 mg Intravenous BID    insulin aspart  0-5 Units Subcutaneous TIDWM    levothyroxine  25 mcg Oral Daily    multivit-iron-FA-calcium-mins  1 tablet Oral Daily    omega-3 fatty acids-fish oil  1 capsule Oral Daily    pantoprazole  40 mg Oral Daily    pravastatin  20 mg Oral QHS    senna-docusate 8.6-50 mg  1 tablet Oral Daily    sodium chloride 0.9%  3 mL Intravenous Q8H    vitamin D  1,000 Units Oral Daily     Continuous Infusions:   heparin (porcine) in D5W 14 Units/kg/hr (04/18/17 0125)     PRN Meds:acetaminophen, dextrose 50%, dextrose 50%, glucagon (human recombinant), glucose, glucose, hydrocodone-acetaminophen 5-325mg, levalbuterol, ondansetron, ondansetron, polyethylene glycol, ramelteon    Review of patient's allergies indicates:   Allergen Reactions    Iodinated contrast media - oral and iv dye     Lipitor [atorvastatin] Other (See Comments)    Ciprofloxacin Nausea And Vomiting       OBJECTIVE:     Vital Signs (Most Recent)  Temp: 96.8 °F (36 °C) (04/18/17 0430)  Pulse: 77 (04/18/17 0430)  Resp: 16 (04/18/17 0430)  BP: 133/80 (04/18/17 0430)  SpO2: 97 % (04/18/17 0430)    Vital Signs Range (Last 24H):  Temp:  [96.2 °F (35.7 °C)-98.2 °F (36.8 °C)]   Pulse:  [65-82]   Resp:  [16-20]   BP: (113-133)/(59-80)   SpO2:  [90 %-99 %]     I & O (Last 24H):  Intake/Output Summary  (Last 24 hours) at 04/18/17 0653  Last data filed at 04/18/17 0200   Gross per 24 hour   Intake              810 ml   Output              551 ml   Net              259 ml            Physical Exam  Gen: NAD  Head/Eyes/Ears/Nose: NCAT, MMM   Neck: Soft, supple, no JVD   Lung: decreased breath sounds bilaterally, no wheezes  Heart: Normal S1/S2, regular rate and rhythm, no gallops, normal PMI, systolic 2/6 murmur at the apex of the heart  Abdomen: Soft, NT/ND, NABS, no masses, no guarding/rebounding, no HSM, no ascitis  Extremities: No LE edema bilaterally, 2+ pulses bilaterally in upper/lower extremities   Skin: Normal color and turgor. No rashes, no petechia, no ecchymoses.   Neuro: AAOx3    Labs:       Recent Labs  Lab 04/17/17  1401 04/18/17  0444   WBC 8.17 8.10   HGB 10.4* 9.6*   HCT 33.6* 30.8*    263   LYMPH 56.4*  4.6 49.4*  4.0   MONO 8.7  0.7 11.7  1.0   EOSINOPHIL 0.5 0.9         Recent Labs  Lab 04/17/17  1401 04/18/17  0444   APTT 27.1  --    INR 1.3* 1.5*        Recent Labs  Lab 04/17/17  1401 04/18/17  0444   * 220*   CALCIUM 9.3 9.0   ALBUMIN 2.8* 2.8*   PROT 6.7 6.5    135*   K 4.3 3.7   CO2 28 27   CL 98 96   BUN 41* 44*   CREATININE 1.5* 1.6*   ALKPHOS 116 109   * 158*   AST 76* 61*   BILITOT 1.2* 1.3*   MG 1.8 1.5*     Estimated Creatinine Clearance: 29.2 mL/min (based on Cr of 1.6).      Recent Labs  Lab 04/17/17  1401   BNP 2688*         Recent Labs  Lab 04/18/17  0444          Microbiology Results (last 7 days)     ** No results found for the last 168 hours. **            ASSESSMENT AND PLAN:     81 y.o. woman with history of CAD s/p NSTEMI 2015 s/p 3V CABG (SVG-LAD, SVG Y graft 1st diagonal and RCA), HTN, HLD, PAF on coumadin, COPD, CVA without deficit 2012, anemia transferred from [place] for further evaluation and treatment of muscular VSD s/p MI.    Recommendations:   -restart plavix  -hold sotalol given low eGFR; consult EP for alternative  anti-arrhythmic therapy  -continue IV diuresis, would consider 60 mg IV BID  -agree with pravastatin given atorvastatin intolerance  -would start metoprolol 12.5 mg po BID; continue to hold ARB given DEBORAH  -will continue to follow    Further recommendations pending attending addendum.    Robin Gonzalez MD  Cardiology Fellow, PGY IV  #[phone]

## 2017-04-18 NOTE — PROGRESS NOTES
Patient takes Temazepam 30mg nightly for insomnia, pharmacy does not carry this non-formulary med and it must be supplied by patient, notified SCAR Moreno.

## 2017-04-18 NOTE — PROGRESS NOTES
Pt wanted her Restoril that she normally takes at home instead of Rozerem,  Spoke with Dr Hurst- Ok to order,  Medication did not pop up in any of lists called pharmacy, found out it is not in the formulary. Notified patient and her daughters-only option tonight is to take rozerem and have one of the daughters bring her home medication.    Pt tried the Rozerem.

## 2017-04-18 NOTE — PROGRESS NOTES
Heparin put on hold for 1 hour d/t anti-Xa result of 1.10, will restart at 11am and decrease by 3units/kg/hr. Tiffanie Sabillon NP and cardio consult at bedside to see patient. Reported patient's KAUFMAN and slightly altered mental status (saying things to daughter that do not make sense) to SULEMA Sabillon NP.

## 2017-04-18 NOTE — CONSULTS
Interventional Cardiology Note      Consult ordered by: JEISON GOOD   PCP:  Alonzo Jean MD  Reason for consult: VSD repair     SUBJECTIVE:     History of Present Illness:  Patient is a 81 y.o. female presents with history of CAD s/p NSTEMI 2015 s/p 3V CABG (SVG-LAD, SVG Y graft 1st diagonal and RCA), HTN, HLD, PAF on coumadin, COPD, CVA without deficit 2012, h/o dye allergy transferred from [place] for evaluation of VSD after an MI 3 weeks ago.  As per patient she had outpatient C-scope and endoscopy 3/28/17 and held her antiplatelets, subsequently, developed neck pain and dyspnea and was discharged.  She Presented back to ED few days later with worsening pain, had new LBBB and elevated troponins of 9 - and was found to have an occluded vein graft to her LAD.  Echo revealed new VSD and EF 30%. Some question of apical thrombus, not noted on our echo.    Echo: 4/16/2017  CONCLUSIONS     1 - Severe left atrial enlargement.     2 - Severely depressed left ventricular systolic function (EF 20-25%).     3 - Muscular VSD at the apex.     4 - Multiple wall motion abnormalities.     5 - Restrictive LV filling pattern, indicating markedly elevated LAP (grade 3 diastolic dysfunction).     6 - Moderate aortic stenosis, DANNI = 1.09 cm2, peak velocity = 1.9 m/s, mean gradient = 8.0 mmHg.     7 - Moderate mitral regurgitation.     8 - Moderate tricuspid regurgitation.     9 - Pulmonary hypertension. The estimated PA systolic pressure is 64 mmHg.     10 - Intermediate central venous pressure.     11 - Pleural effusion is present.        Review of patient's allergies indicates:   Allergen Reactions    Iodinated contrast media - oral and iv dye     Lipitor [atorvastatin] Other (See Comments)    Ciprofloxacin Nausea And Vomiting     Past Medical History:   Diagnosis Date    A-fib     Anticoagulant long-term use     Arthritis     Asthma     Coronary artery disease     Diabetes mellitus     History of migraine  headaches     complex migrains    Hypertension     Migraine     Neuropathy of foot     both feet    MARY (obstructive sleep apnea)     Stroke     Thyroid disease     Unspecified sleep apnea     wears nasal cannula with 2.5 lt O2     Past Surgical History:   Procedure Laterality Date    BACK SURGERY      bilateral arthroscope of knees      CARDIAC SURGERY      CATARACT EXTRACTION W/ INTRAOCULAR LENS  IMPLANT, BILATERAL      HYSTERECTOMY      ROTATOR CUFF REPAIR      Bilateral repairs     History reviewed. No pertinent family history.  Social History   Substance Use Topics    Smoking status: Former Smoker     Years: 30.00     Types: Cigarettes     Quit date: 1985    Smokeless tobacco: None    Alcohol use No     Review of Systems   Constitutional: Negative for fever and + for malaise/fatigue.   Respiratory: Positive for cough and shortness of breath.    Cardiovascular: Negative for chest pain, palpitations, claudication and leg swelling.   Gastrointestinal: Negative for abdominal pain, nausea and vomiting.   Genitourinary: Negative for dysuria.   Musculoskeletal: Negative for falls.   Skin: Negative for rash.   Neurological: Negative for dizziness, seizures and headaches.   Endo/Heme/Allergies: Negative for polydipsia.     OBJECTIVE:     Vital Signs:  Temp:  [97.5 °F (36.4 °C)-98.4 °F (36.9 °C)]   Pulse:  [76-80]   Resp:  [17-20]   BP: (127-133)/(76-85)   SpO2:  [96 %-98 %]     Physical Exam   Constitutional: She is oriented to person, place, and time. She appears well-developed and well-nourished.   HENT:   Head: Normocephalic.   Eyes: Pupils are equal, round, and reactive to light.   Neck: JVD present.   Cardiovascular: Normal rate and regular rhythm.    Murmur heard.  Pulmonary/Chest: Effort normal. Crackles at BL bases.  Decreased breath sounds left lung base.   Abdominal: Soft. Obese NT  Musculoskeletal: Normal range of motion.   Neurological: She is alert and oriented to person, place, and time.    Skin: skin is cool.   Psychiatric: She has a normal mood and affect.     Laboratory:  CBC:     Recent Labs  Lab 04/18/17  0444   WBC 8.10   RBC 3.79*   HGB 9.6*   HCT 30.8*      MCV 81*   MCH 25.3*   MCHC 31.2*     BMP:     Recent Labs  Lab 04/18/17  0444   *   *   K 3.7   CL 96   CO2 27   BUN 44*   CREATININE 1.6*   CALCIUM 9.0   MG 1.5*     Coagulation:     Recent Labs  Lab 04/17/17  1401 04/18/17  0444   INR 1.3* 1.5*   APTT 27.1  --      Diagnostic Results:  Pending ECHO    ASSESSMENT/PLAN:   Patient is a 81 y.o. female presents with history of CAD s/p NSTEMI 2015 s/p 3V CABG (SVG-LAD, SVG Y graft 1st diagonal and RCA), HTN, HLD, PAF on coumadin, COPD, CVA without deficit 2012, anemia transferred from [place] for further evaluation and VSD s/p MI.     VSD s/p MI:  - consider closing with closure device   - Will likely need anesthesia support and need NE  - Aggressive diuresis - would aim for net negative 1 liter daily  - Will make NPO for consideration of possible VSD closure.  - Dye prep       Lynda Polanco MD  Interventional Cardiology

## 2017-04-18 NOTE — PROGRESS NOTES
Spoke with daughter regarding code status, patient wishes to remain DNR for all times other than surgery. Spoke with SCAR Moreno regarding this matter as well.

## 2017-04-19 PROBLEM — E11.40 TYPE 2 DIABETES MELLITUS WITH DIABETIC NEUROPATHY, WITH LONG-TERM CURRENT USE OF INSULIN: Status: ACTIVE | Noted: 2017-01-01

## 2017-04-19 PROBLEM — Z79.4 TYPE 2 DIABETES MELLITUS WITH DIABETIC NEUROPATHY, WITH LONG-TERM CURRENT USE OF INSULIN: Status: ACTIVE | Noted: 2017-01-01

## 2017-04-19 PROBLEM — N28.9 ACUTE RENAL INSUFFICIENCY: Status: ACTIVE | Noted: 2017-01-01

## 2017-04-19 PROBLEM — R74.01 ELEVATED TRANSAMINASE LEVEL: Status: ACTIVE | Noted: 2017-01-01

## 2017-04-19 PROBLEM — I13.0 HYPERTENSIVE HEART AND KIDNEY DISEASE WITH HEART FAILURE: Status: ACTIVE | Noted: 2017-01-01

## 2017-04-19 NOTE — ASSESSMENT & PLAN NOTE
- continue ASA and statin   -on heparin gtt for sub therapeutic INR in setting of Hx pAFIB, hold an hour prior to procedure

## 2017-04-19 NOTE — ASSESSMENT & PLAN NOTE
-currently denies chest pain  -LHC at [place] 4/7 with 80-90% LM stenosis, 95% ostial and total occlusion proximal LAD, 70% stenosis of proximal RCA, patent saphenous vein graft to RCA, patent saphenous vein graft w/ Y to 1st to diag and 2nd obtuse, 50% stenosis of saphenous vein great to obtuse marginal, total occlusion of saphenous vein graft to LAD, large apical akinetic segment, apical VSD  -continue asa and statin  -interventional cardiology and CTS consulted, awaiting recommendations

## 2017-04-19 NOTE — ASSESSMENT & PLAN NOTE
Cr bumped from 1.6 to 2.4 o/n after attempting to diurese, however she has some level of cardiogenic shock and would consider checking a lactic acid level.

## 2017-04-19 NOTE — SUBJECTIVE & OBJECTIVE
Interval History: Daughter states she's talking in weird sentences that do not make sense at times.     Review of Systems   Constitutional: Positive for activity change, diaphoresis and fatigue. Negative for chills, fever and unexpected weight change.   HENT: Negative for congestion, sinus pressure and trouble swallowing.    Eyes: Negative.    Respiratory: Positive for shortness of breath. Negative for cough, chest tightness and wheezing.    Cardiovascular: Positive for leg swelling. Negative for chest pain and palpitations.   Gastrointestinal: Positive for constipation (due to iron supplementation ). Negative for abdominal distention, abdominal pain, blood in stool, diarrhea, nausea and vomiting.   Endocrine: Negative.    Genitourinary: Negative for decreased urine volume and difficulty urinating.   Musculoskeletal: Negative.    Skin: Negative.    Allergic/Immunologic: Negative.    Neurological: Negative for dizziness, syncope, light-headedness and headaches.   Hematological: Negative.    Psychiatric/Behavioral: Negative.      Objective:     Vital Signs (Most Recent):  Temp: 97.7 °F (36.5 °C) (04/18/17 2000)  Pulse: 76 (04/18/17 2300)  Resp: (!) 22 (04/18/17 2000)  BP: (!) 142/81 (04/18/17 2000)  SpO2: 97 % (04/18/17 1915) Vital Signs (24h Range):  Temp:  [96.8 °F (36 °C)-98.4 °F (36.9 °C)] 97.7 °F (36.5 °C)  Pulse:  [74-80] 76  Resp:  [16-22] 22  SpO2:  [96 %-98 %] 97 %  BP: (127-142)/(76-85) 142/81     Weight: 93.4 kg (205 lb 14.6 oz)  Body mass index is 36.48 kg/(m^2).    Intake/Output Summary (Last 24 hours) at 04/18/17 2311  Last data filed at 04/18/17 2200   Gross per 24 hour   Intake          1512.14 ml   Output             1202 ml   Net           310.14 ml      Physical Exam   Constitutional: She is oriented to person, place, and time. She appears well-developed and well-nourished. No distress.   HENT:   Head: Normocephalic and atraumatic.   Mouth/Throat: Oropharynx is clear and moist.   Eyes: Conjunctivae  and EOM are normal.   Neck: JVD present.   Cardiovascular: Normal rate, regular rhythm, normal heart sounds and intact distal pulses.  Exam reveals no gallop and no friction rub.    No murmur heard.  Pulmonary/Chest: Effort normal. She has decreased breath sounds in the right lower field and the left lower field. She has no wheezes. She exhibits no tenderness.   Abdominal: Soft. Bowel sounds are normal. She exhibits no distension and no mass. There is no tenderness. There is no rebound and no guarding.   Musculoskeletal: She exhibits no edema.   Neurological: She is alert and oriented to person, place, and time. No cranial nerve deficit.   Skin: Skin is warm and dry. No rash noted. She is not diaphoretic. No erythema. There is pallor.   Psychiatric: She has a normal mood and affect.   Flat affect, daughter states she's having in appropriate conversation's, discombobulated thought processes   Nursing note and vitals reviewed.      Significant Labs:   BMP:   Recent Labs  Lab 04/18/17  0444   *   *   K 3.7   CL 96   CO2 27   BUN 44*   CREATININE 1.6*   CALCIUM 9.0   MG 1.5*     CBC:   Recent Labs  Lab 04/17/17  1401 04/18/17  0444   WBC 8.17 8.10   HGB 10.4* 9.6*   HCT 33.6* 30.8*    263     CMP:   Recent Labs  Lab 04/17/17  1401 04/18/17  0444    135*   K 4.3 3.7   CL 98 96   CO2 28 27   * 220*   BUN 41* 44*   CREATININE 1.5* 1.6*   CALCIUM 9.3 9.0   PROT 6.7 6.5   ALBUMIN 2.8* 2.8*   BILITOT 1.2* 1.3*   ALKPHOS 116 109   AST 76* 61*   * 158*   ANIONGAP 10 12   EGFRNONAA 32.7* 30.2*     Cardiac Markers:   Recent Labs  Lab 04/18/17  2056   BNP 3231*     Lipid Panel:   Recent Labs  Lab 04/17/17  1401   CHOL 119*   HDL 19*   LDLCALC 87.0   TRIG 65   CHOLHDL 16.0*       Significant Imaging: I have reviewed and interpreted all pertinent imaging results/findings within the past 24 hours.

## 2017-04-19 NOTE — PROGRESS NOTES
PT arrived to room 3078 from [facility], pt obtunded upon arrival however arouses to loud stimuli and able to answer orientation questions appropriately, pt only has one PIV, will use ultrasound to get IV access.

## 2017-04-19 NOTE — ASSESSMENT & PLAN NOTE
-BP wnl now  -at time of discharge from [place] she was receiving: sotalol 40 mg Q12, losartan 50 mg QD, coreg 3.125 mg BID: will hold all for now in setting of normotensive and NSR

## 2017-04-19 NOTE — ASSESSMENT & PLAN NOTE
-H/H stable, will monitor  -anemia profile and retic count- ACD   - retic 5.7 appropriate response

## 2017-04-19 NOTE — PROGRESS NOTES
80 yo WF transferred from floor to ICU today due to worsening condition associated with post-MI acute VSD superimposed upon severe LV dysfunction.  She was somnolent upon arrival to unit but had received benadryl and sedative medications.  Initial plan was to stabilize and proceed with percutaneous closure of VSD so permission obtained for TLC as no IV access.  During her evaluation Dr. Savage and I spoke and he did not feel that there was anything he had to offer her.  She was not surgical candidate.  She had revoked DNR to have intervention.  Upon discussing her condition, prognosis and likely clinical course with family it was elected to resume DNR as family knows that is what she would want.  The main interest is in keeping her comfortable.  She is on  and will continue this with lasix.  She was on heparin for concern of LV thrombus at outside hospital.  They understand that she is likely to worsen and has high risk of mortality over the next 72 hrs.  We will work on medical therapy but with Cr bump use apresoline and nitrates for unloading and will see if I am able to improve her condition.    Evaluation of patient  Review of chart/records and studies including CXR/EKG/ECHO/labs  Coordination of care with Dr. Savage  Meeting with family to formulate care plan    Critical care 70 min

## 2017-04-19 NOTE — ASSESSMENT & PLAN NOTE
-s/p MI in early April, transferred here from [facility] for evaluation of possible percutaneous closure of muscular VSD  -2D echo 4/17 with EF 20%, moderate MR, moderate TR, moderate AS, PASP 64.25, and diastolic dysfunction, muscular VSD at apex, multiple wall motion abnormalities   -echo at OSH 4/11 with EF 35%, mild MR, moderate TR, severely elevated PA pressures, PASP estimated 79, dilated IVC, elevated RA pressure  -interventional cardiology consulted- planning for amplatzer device however on hold d/t worsening CR and worsening edema/ poor diuresis with boluses of lasix, switching to lasix gtt and transferring to the ICU.   -CTS consulted and she is deferred from surgical perspective, comorbidities too high  -BP stable at this time, will adjust medications as needed   -at time of discharge from [place] she was receiving: sotalol 40 mg Q12, losartan 50 mg QD, coreg 3.125 mg BID, all currently on hold  -volume overloaded on exam, + JVD and shortness of breath with conversation, BNP 2688: started with furosemide 40 mg BID, poor response so increased dose to 80mg bid and continued to have poor response, Per Dr. Polanco she wants a lasix gtt instead of the bolus  -continue telemetry monitoring  - some level of cardiogenic shock, cool to touch head to toe, altered Mental status, she appears to be slightly warmer in her upper extremities today.

## 2017-04-19 NOTE — PROGRESS NOTES
[facility] Hospital Medicine  Progress Note    Patient Name: Millie Thompson  MRN: [id number]  Patient Class: IP- Inpatient   Admission Date: 4/17/2017  Length of Stay: 2 days  Attending Physician: Darek Butts MD  Primary Care Provider: Alonzo Jean MD    [facility] Medicine Team: [facility] HOSP MED J Ольга Sabillon NP    Subjective:     Principal Problem:Acute MI, anterior wall, subsequent episode of care    HPI:  Ms. Thompson is an 81 y.o. female with past medical history of CAD s/p NSTEMI 2015 s/p 3V CABG (SVG-LAD, SVG Y graft 1st diagonal and RCA), HTN, HLD, PAF on coumadin, COPD, CVA without deficit 2012, anemia transferred from [facility] for further evaluation and treatment of muscular VSD s/p MI. Patient underwent outpatient colonoscopy 3/28/17 for evaluation of anemia (which by report was not very notable), experienced neck pain post procedure, went to ER 3/30/17 due to dyspnea and was given a steroid injection and discharged from ER. Family reports patient did not have EKG at that time. Dyspnea and neck pain worsened prompting presentation again to the ED on 4/3/17 at which time she had new LBBB and troponinemia upwards of 9. C performed 4/7/17 which showed occluded vein graft to LAD, akenetic apex with apical muscular VSD. Echo 4/11/17 showed EF 30-35%, LVEDD 5 cm, normal RV, PAS 60-70 mmHg, mild to moderate AS. There was questionable hx of apical thrombus per report but this was not noted on echo report? Patient was evaluated by CTS who recommended 6-8 week cooling off period before they would operate. Patient transferred to [facility] for eval of percutaneous repair.     She denies any chest pain or palpitations. She does endorse shortness of breath with minimal exertion and mild BLE swelling. She denies N/V/D. Endorses decreased appetite. Denies fever or chills. Endorses night sweats and intermittent diaphoresis with dyspneic episodes since colonoscopy in March.     She  was admitted to the Hospital Medicine Service for further management.          Hospital Course:  Ms. Thompson has been admitted from [facility] for evaluation of percutaneous closure of muscular VSD as she's not a surgical candidate per Dr. Mcgregor. She appears volume overloaded on exam in setting of JVD and shortness of breath with conversation.  Her weight went up 16 lbs from admit, presume they didn't weigh her properly if at all. Daughter states she was 205.3 at [facility], which is more align with what she is currently.  Attempted to increase IV lasix for mild to moderate diuresis, however she didn't respond well o/n.  Dr. Savage evaluated patient and as considering her for amplatzer device, however her CR bumped to 2.4 from 1.6.  She was started on her dye prep which included benadryl which made her very sleep, but arouseable this am.  Dr. Polanco from interventional cardiology felt she is declining and would like her transferred to the ICU for closer monitoring and I would assume possible IABP unless it's contraindicated with a VSD.  Dr. Hdz is aware and has accepted the patient for transfer.     Interval History: Sleepy but arousable, she states her breathing is okay.  Daughter at bedside, updated on condition and plan for transfer to ICU.  She cannot recall how much she urinated overnight.    Review of Systems   Constitutional: Positive for activity change and fatigue (took benadryl for dye prep). Negative for chills, diaphoresis, fever and unexpected weight change (seems to be gaining weight? scales different vs bedscale? ).   HENT: Negative for congestion, sinus pressure and trouble swallowing.    Eyes: Negative.    Respiratory: Negative for cough, chest tightness, shortness of breath and wheezing.    Cardiovascular: Positive for leg swelling. Negative for chest pain and palpitations.   Gastrointestinal: Positive for constipation (due to iron supplementation ). Negative for abdominal  distention, abdominal pain, blood in stool, diarrhea, nausea and vomiting.   Endocrine: Negative.    Genitourinary: Negative.  Negative for decreased urine volume and difficulty urinating.   Musculoskeletal: Negative.    Skin: Negative.    Allergic/Immunologic: Negative.    Neurological: Negative for dizziness, syncope, light-headedness and headaches.   Hematological: Negative.    Psychiatric/Behavioral: Positive for confusion (daughter states confused train of thoughts at times.) and decreased concentration.     Objective:     Vital Signs (Most Recent):  Temp: 96.5 °F (35.8 °C) (04/19/17 0800)  Pulse: 83 (04/19/17 1200)  Resp: 17 (04/19/17 1200)  BP: (!) 166/79 (04/19/17 1200)  SpO2: 97 % (04/19/17 1200) Vital Signs (24h Range):  Temp:  [96.3 °F (35.7 °C)-97.7 °F (36.5 °C)] 96.5 °F (35.8 °C)  Pulse:  [72-83] 83  Resp:  [17-40] 17  SpO2:  [91 %-97 %] 97 %  BP: (109-166)/(71-95) 166/79     Weight: 97.1 kg (214 lb 1.1 oz)  Body mass index is 37.92 kg/(m^2).    Intake/Output Summary (Last 24 hours) at 04/19/17 1418  Last data filed at 04/19/17 1300   Gross per 24 hour   Intake           859.15 ml   Output              750 ml   Net           109.15 ml      Physical Exam   Constitutional: She appears well-developed and well-nourished. No distress.   HENT:   Head: Normocephalic and atraumatic.   Mouth/Throat: Oropharynx is clear and moist.   Eyes: Conjunctivae and EOM are normal.   Neck: JVD present.   Cardiovascular: Normal rate, regular rhythm and intact distal pulses.  Exam reveals no gallop and no friction rub.    Murmur (mitral area II/VI) heard.  Pulmonary/Chest: Effort normal. She has no decreased breath sounds. She has wheezes (inspiratory wheezing noted with faint crackles). She has rales (faint bibasilar ). She exhibits no tenderness.   Abdominal: Soft. Bowel sounds are normal. She exhibits no distension and no mass. There is no tenderness. There is no rebound and no guarding.   Musculoskeletal: She exhibits  edema (ble/ bue). She exhibits no tenderness.   Neurological: No cranial nerve deficit.   Lethargic from benadryl.   Skin: Skin is dry. No rash noted. She is not diaphoretic. No erythema. There is pallor.   Cool lower extremities, warm uppers, which is improved from yesterday, where she was cold head to toe.   Psychiatric:   Flat affect, daughter states she's having in appropriate conversation's, discombobulated thought processes, this am she's lethargic but arousable.    Nursing note and vitals reviewed.      Significant Labs:   BMP:   Recent Labs  Lab 04/19/17  0505   *   *   K 4.7   CL 94*   CO2 22*   BUN 50*   CREATININE 2.4*   CALCIUM 9.2   MG 2.1     CBC:   Recent Labs  Lab 04/18/17  0444   WBC 8.10   HGB 9.6*   HCT 30.8*        CMP:   Recent Labs  Lab 04/18/17  0444 04/19/17  0505   * 133*   K 3.7 4.7   CL 96 94*   CO2 27 22*   * 215*   BUN 44* 50*   CREATININE 1.6* 2.4*   CALCIUM 9.0 9.2   PROT 6.5 7.0   ALBUMIN 2.8* 3.0*   BILITOT 1.3* 1.5*   ALKPHOS 109 110   AST 61* 62*   * 139*   ANIONGAP 12 17*   EGFRNONAA 30.2* 18.5*       Significant Imaging: I have reviewed all pertinent imaging results/findings within the past 24 hours.    Assessment/Plan:      VSD (ventricular septal defect), muscular, acut post-anterior MI  -s/p MI in early April, transferred here from [facility] for evaluation of possible percutaneous closure of muscular VSD  -2D echo 4/17 with EF 20%, moderate MR, moderate TR, moderate AS, PASP 64.25, and diastolic dysfunction, muscular VSD at apex, multiple wall motion abnormalities   -echo at OSH 4/11 with EF 35%, mild MR, moderate TR, severely elevated PA pressures, PASP estimated 79, dilated IVC, elevated RA pressure  -interventional cardiology consulted- planning for amplatzer device however on hold d/t worsening CR and worsening edema/ poor diuresis with boluses of lasix, switching to lasix gtt and transferring to the ICU.   -CTS consulted and  she is deferred from surgical perspective, comorbidities too high  -BP stable at this time, will adjust medications as needed   -at time of discharge from [place] she was receiving: sotalol 40 mg Q12, losartan 50 mg QD, coreg 3.125 mg BID, all currently on hold  -volume overloaded on exam, + JVD and shortness of breath with conversation, BNP 2688: started with furosemide 40 mg BID, poor response so increased dose to 80mg bid and continued to have poor response, Per Dr. Polanco she wants a lasix gtt instead of the bolus  -continue telemetry monitoring  - some level of cardiogenic shock, cool to touch head to toe, altered Mental status, she appears to be slightly warmer in her upper extremities today.     * Acute MI, anterior wall, subsequent episode of care complicated by acute VSD  -currently denies chest pain  -[facility] at [place] 4/7 with 80-90% LM stenosis, 95% ostial and total occlusion proximal LAD, 70% stenosis of proximal RCA, patent saphenous vein graft to RCA, patent saphenous vein graft w/ Y to 1st to diag and 2nd obtuse, 50% stenosis of saphenous vein great to obtuse marginal, total occlusion of saphenous vein graft to LAD, large apical akinetic segment, apical VSD  -continue asa and statin  -interventional cardiology and CTS consulted, CTS has turned her down for surgery.  Interventional has made the patient DNR and will be tuned up overnight in the ICU and back to the floor in am.    Essential hypertension  -BP wnl now  -at time of discharge from [place] she was receiving: sotalol 40 mg Q12, losartan 50 mg QD, coreg 3.125 mg BID: will hold all for now in setting of normotensive and NSR    Type 2 diabetes mellitus with diabetic neuropathy, with long-term current use of insulin  -begin low dose SSI  -will monitor CBG    Hypothyroidism due to acquired atrophy of thyroid  -continue home synthroid  - TSH, T4 wnl    Coronary artery disease involving coronary bypass graft of native heart without angina pectoris  -  continue ASA and statin   -on heparin gtt for sub therapeutic INR in setting of Hx pAFIB, consider resuming warfarin, unless she's too obtunded     MARY (obstructive sleep apnea)  - wears oxygen at home PRN 2.5 liters at night  - no CPAP  - consider ambulatory referral to sleep medicine upon to discharge    Hx of paroxysmal a-fib  -?? ablation during CABG procedure in 2015  -denies any further episodes of Afib since  -on warfarin 7.5 mg at home, will hold now in setting of potential surgical procedure  -PT/INR 13.7/1.3 on admission   -initiate heparin gtt without bolus    Anemia  -H/H stable, will monitor  -anemia profile and retic count- ACD   - retic 5.7 appropriate response    Diabetic polyneuropathy        GERD (gastroesophageal reflux disease)  - continue protonix    Acute renal insufficiency  Cr bumped from 1.6 to 2.4 o/n after attempting to diurese, however she has some level of cardiogenic shock and would consider checking a lactic acid level.        VTE Risk Mitigation     None          Ольга Sabillon NP  Department of Hospital Medicine   [facility]  Spectra:  [phone]  Pager: [phone]

## 2017-04-19 NOTE — ASSESSMENT & PLAN NOTE
- wears oxygen at home PRN 2.5 liters at night  - no CPAP  - consider ambulatory referral to sleep medicine upon to discharge

## 2017-04-19 NOTE — PT/OT/SLP PROGRESS
Physical Therapy      Millie Thompson  MRN: [id number]    Patient not seen today secondary to pt transferred to ICU; will need new PT orders for evaluation and treatment. Will follow-up with new PT orders.    Stephane Mariscal, PT

## 2017-04-19 NOTE — PLAN OF CARE
Problem: Patient Care Overview  Goal: Plan of Care Review  Outcome: Ongoing (interventions implemented as appropriate)  Pt VSS at this time, pt still very lethargic, pt only able to keep eyes open for few seconds, pt on dobutamine lasix & heparin gtts, pt made a DNR today, family very appropriate to situation, MD Savage talked to family about sending patient home with hospice, see flow sheet for full assessment, will continue to monitor patient closely.

## 2017-04-19 NOTE — ASSESSMENT & PLAN NOTE
-BP wnl now  -at time of discharge from [place] she was receiving: sotalol 40 mg Q12, losartan 50 mg QD, coreg 3.125 mg BID: will hold all for now in setting of normotensive and NSR

## 2017-04-19 NOTE — ASSESSMENT & PLAN NOTE
-s/p MI in early April, transferred here from [facility] for evaluation of possible percutaneous closure of muscular VSD  -2D echo 4/17 with EF 20%, moderate MR, moderate TR, moderate AS, PASP 64.25, and diastolic dysfunction, muscular VSD at apex, multiple wall motion abnormalities   -echo at OSH 4/11 with EF 35%, mild MR, moderate TR, severely elevated PA pressures, PASP estimated 79, dilated IVC, elevated RA pressure  -interventional cardiology consulted- planning for amplatzer device in am  -CTS consulted  -BP stable at this time, will adjust medications as needed   -at time of discharge from [place] she was receiving: sotalol 40 mg Q12, losartan 50 mg QD, coreg 3.125 mg BID  -volume overloaded on exam, + JVD and shortness of breath with conversation, BNP 2688: will add furosemide 40 mg BID >>> BUN 41, SCr 1.5 on admission, poor response so increased dose to 80mg bid  -continue telemetry monitoring  -low threshold to transfer to ICU if any instability  - some level of cardiogenic shock, cool to touch head to toe, altered Mental status

## 2017-04-19 NOTE — PROGRESS NOTES
[facility] Hospital Medicine  Progress Note    Patient Name: Millie Thompson  MRN: [id number]  Patient Class: IP- Inpatient   Admission Date: 4/17/2017  Length of Stay: 1 days  Attending Physician: Darek Butts MD  Primary Care Provider: Alonzo Jean MD    [facility] Medicine Team: [facility] MED J Ольга Sabillon NP    Subjective:     Principal Problem:VSD (ventricular septal defect)    HPI:  Ms. Thompson is an 81 y.o. female with past medical history of CAD s/p NSTEMI 2015 s/p 3V CABG (SVG-LAD, SVG Y graft 1st diagonal and RCA), HTN, HLD, PAF on coumadin, COPD, CVA without deficit 2012, anemia transferred from [facility] for further evaluation and treatment of muscular VSD s/p MI. Patient underwent outpatient colonoscopy 3/28/17 for evaluation of anemia (which by report was not very notable), experienced neck pain post procedure, went to ER 3/30/17 due to dyspnea and was given a steroid injection and discharged from ER. Family reports patient did not have EKG at that time. Dyspnea and neck pain worsened prompting presentation again to the ED on 4/3/17 at which time she had new LBBB and troponinemia upwards of 9. C performed 4/7/17 which showed occluded vein graft to LAD, akenetic apex with apical muscular VSD. Echo 4/11/17 showed EF 30-35%, LVEDD 5 cm, normal RV, PAS 60-70 mmHg, mild to moderate AS. There was questionable hx of apical thrombus per report but this was not noted on echo report? Patient was evaluated by CTS who recommended 6-8 week cooling off period before they would operate. [place] [facility] [place] of percutaneous repair.     She denies any chest pain or palpitations. She does endorse shortness of breath with minimal exertion and mild BLE swelling. She denies N/V/D. Endorses decreased appetite. Denies fever or chills. Endorses night sweats and intermittent diaphoresis with dyspneic episodes since colonoscopy in March.     She was admitted to the  Hospital Medicine Service for further management.          Hospital Course:  Ms. Thompson has been admitted from [facility] for evaluation of percutaneous closure of muscular VSD as she's not a surgical candidate per Dr. Mcgregor. She will be placed on telemetry and will begin diagnostic evaluation with labs, EKG, 2D echo, and CXR. She appears volume overloaded on exam in setting of JVD and shortness of breath with conversation.  Her weight went up 16 lbs from admit, presume they didn't weigh her properly if at all. Daughter states she was 205.3 at [facility], which is more align with what she is today.  Increasing lasix for mild to moderate diuresis, Dr. Savage evaluated patient and will be taken for amplatzer device in am.     Interval History: Daughter states she's talking in weird sentences that do not make sense at times.     Review of Systems   Constitutional: Positive for activity change, diaphoresis and fatigue. Negative for chills, fever and unexpected weight change.   HENT: Negative for congestion, sinus pressure and trouble swallowing.    Eyes: Negative.    Respiratory: Positive for shortness of breath. Negative for cough, chest tightness and wheezing.    Cardiovascular: Positive for leg swelling. Negative for chest pain and palpitations.   Gastrointestinal: Positive for constipation (due to iron supplementation ). Negative for abdominal distention, abdominal pain, blood in stool, diarrhea, nausea and vomiting.   Endocrine: Negative.    Genitourinary: Negative for decreased urine volume and difficulty urinating.   Musculoskeletal: Negative.    Skin: Negative.    Allergic/Immunologic: Negative.    Neurological: Negative for dizziness, syncope, light-headedness and headaches.   Hematological: Negative.    Psychiatric/Behavioral: Negative.      Objective:     Vital Signs (Most Recent):  Temp: 97.7 °F (36.5 °C) (04/18/17 2000)  Pulse: 76 (04/18/17 2300)  Resp: (!) 22 (04/18/17 2000)  BP: (!)  142/81 (04/18/17 2000)  SpO2: 97 % (04/18/17 1915) Vital Signs (24h Range):  Temp:  [96.8 °F (36 °C)-98.4 °F (36.9 °C)] 97.7 °F (36.5 °C)  Pulse:  [74-80] 76  Resp:  [16-22] 22  SpO2:  [96 %-98 %] 97 %  BP: (127-142)/(76-85) 142/81     Weight: 93.4 kg (205 lb 14.6 oz)  Body mass index is 36.48 kg/(m^2).    Intake/Output Summary (Last 24 hours) at 04/18/17 2311  Last data filed at 04/18/17 2200   Gross per 24 hour   Intake          1512.14 ml   Output             1202 ml   Net           310.14 ml      Physical Exam   Constitutional: She is oriented to person, place, and time. She appears well-developed and well-nourished. No distress.   HENT:   Head: Normocephalic and atraumatic.   Mouth/Throat: Oropharynx is clear and moist.   Eyes: Conjunctivae and EOM are normal.   Neck: JVD present.   Cardiovascular: Normal rate, regular rhythm, normal heart sounds and intact distal pulses.  Exam reveals no gallop and no friction rub.    No murmur heard.  Pulmonary/Chest: Effort normal. She has decreased breath sounds in the right lower field and the left lower field. She has no wheezes. She exhibits no tenderness.   Abdominal: Soft. Bowel sounds are normal. She exhibits no distension and no mass. There is no tenderness. There is no rebound and no guarding.   Musculoskeletal: She exhibits no edema.   Neurological: She is alert and oriented to person, place, and time. No cranial nerve deficit.   Skin: Skin is warm and dry. No rash noted. She is not diaphoretic. No erythema. There is pallor.   Psychiatric: She has a normal mood and affect.   Flat affect, daughter states she's having in appropriate conversation's, discombobulated thought processes   Nursing note and vitals reviewed.      Significant Labs:   BMP:   Recent Labs  Lab 04/18/17  0444   *   *   K 3.7   CL 96   CO2 27   BUN 44*   CREATININE 1.6*   CALCIUM 9.0   MG 1.5*     CBC:   Recent Labs  Lab 04/17/17  1401 04/18/17  0444   WBC 8.17 8.10   HGB 10.4* 9.6*    HCT 33.6* 30.8*    263     CMP:   Recent Labs  Lab 04/17/17  1401 04/18/17  0444    135*   K 4.3 3.7   CL 98 96   CO2 28 27   * 220*   BUN 41* 44*   CREATININE 1.5* 1.6*   CALCIUM 9.3 9.0   PROT 6.7 6.5   ALBUMIN 2.8* 2.8*   BILITOT 1.2* 1.3*   ALKPHOS 116 109   AST 76* 61*   * 158*   ANIONGAP 10 12   EGFRNONAA 32.7* 30.2*     Cardiac Markers:   Recent Labs  Lab 04/18/17  2056   BNP 3231*     Lipid Panel:   Recent Labs  Lab 04/17/17  1401   CHOL 119*   HDL 19*   LDLCALC 87.0   TRIG 65   CHOLHDL 16.0*       Significant Imaging: I have reviewed and interpreted all pertinent imaging results/findings within the past 24 hours.    Assessment/Plan:      * VSD (ventricular septal defect)  -s/p MI in early April, transferred here from [facility] for evaluation of possible percutaneous closure of muscular VSD  -2D echo 4/17 with EF 20%, moderate MR, moderate TR, moderate AS, PASP 64.25, and diastolic dysfunction, muscular VSD at apex, multiple wall motion abnormalities   -echo at OSH 4/11 with EF 35%, mild MR, moderate TR, severely elevated PA pressures, PASP estimated 79, dilated IVC, elevated RA pressure  -interventional cardiology consulted- planning for amplatzer device in am  -CTS consulted  -BP stable at this time, will adjust medications as needed   -at time of discharge from [place] she was receiving: sotalol 40 mg Q12, losartan 50 mg QD, coreg 3.125 mg BID  -volume overloaded on exam, + JVD and shortness of breath with conversation, BNP 2688: will add furosemide 40 mg BID >>> BUN 41, SCr 1.5 on admission, poor response so increased dose to 80mg bid  -continue telemetry monitoring  -low threshold to transfer to ICU if any instability  - some level of cardiogenic shock, cool to touch head to toe, altered Mental status    Acute anterior wall MI  -currently denies chest pain  -LHC at [place] 4/7 with 80-90% LM stenosis, 95% ostial and total occlusion proximal LAD, 70% stenosis of  proximal RCA, patent saphenous vein graft to RCA, patent saphenous vein graft w/ Y to 1st to diag and 2nd obtuse, 50% stenosis of saphenous vein great to obtuse marginal, total occlusion of saphenous vein graft to LAD, large apical akinetic segment, apical VSD  -continue asa and statin  -interventional cardiology and CTS consulted, awaiting recommendations    Essential hypertension  -BP wnl now  -at time of discharge from [place] she was receiving: sotalol 40 mg Q12, losartan 50 mg QD, coreg 3.125 mg BID: will hold all for now in setting of normotensive and NSR    DM type 2 (diabetes mellitus, type 2)  -begin low dose SSI  -will monitor CBG    Hypothyroidism due to acquired atrophy of thyroid  -continue home synthroid  - TSH, T4 wnl    Coronary artery disease involving coronary bypass graft of native heart without angina pectoris  - continue ASA and statin   -on heparin gtt for sub therapeutic INR in setting of Hx pAFIB, hold an hour prior to procedure    MARY (obstructive sleep apnea)  - wears oxygen at home PRN 2.5 liters at night  - no CPAP  - consider ambulatory referral to sleep medicine upon to discharge    Chronic passive congestion of liver  -AST 61, , TBili 1.3     - these are improved from [place] admission  -continue to monitor    Paroxysmal a-fib  -?? ablation during CABG procedure in 2015  -denies any further episodes of Afib since  -on warfarin 7.5 mg at home, will hold now in setting of potential surgical procedure  -PT/INR 13.7/1.3 on admission   -initiate heparin gtt without bolus    Anemia  -H/H stable, will monitor  -anemia profile and retic count- ACD   - retic 5.7 appropriate response    GERD (gastroesophageal reflux disease)  - continue protonix    VTE Risk Mitigation     Julita Sabillon NP  Department of Hospital Medicine   [facility]

## 2017-04-19 NOTE — PROGRESS NOTES
LOS: 2 days     24h events and S:  Millie Thompson is a 81 y.o. female who was transferred to CMICU from medicine. The patient has a PMH of CAD with NSTEMI in 2015 s/p CABG (SVG-LAD, SVG Y-graft 1st diagonal and RCA), pAF on warfarin, and CVA without deficit in 2012. She was transferred on 4/17 for VSD management after presenting with ACS (dsypnea, neck pain, LBBB, Tn ~9) on 4/3. A LHC was c/w occluded vein graft to the LAD, AK apex, and an apical muscular VSD. ECHO on 4/17 c/w EF 20%, moderate MR/TR/AS, PASP 64, and apical VSD. Medicine was concerned she was going into cardiogenic shock today in the setting of AMS, weight gain of 16 lbs, and SOB. The patient is sleepy on exam.      O:  Vitals:  Temp: 96.5 °F (35.8 °C) (04/19/17 0800)  Pulse: 83 (04/19/17 1200)  Resp: 17 (04/19/17 1200)  BP: (!) 166/79 (04/19/17 1200)  SpO2: 97 % (04/19/17 1200)    Ins/Outs:    Intake/Output Summary (Last 24 hours) at 04/19/17 1351  Last data filed at 04/19/17 1300   Gross per 24 hour   Intake          1279.15 ml   Output             1452 ml   Net          -172.85 ml     ROS:  Unable to obtain; decreased LOC    PE:  General: decreased LOC, snoring, not following any commands  Skin: no nail pitting apparent  HEENT: no scleral icterus, EOMI, no ear discharge  Neck: no masses  Heart: RRR, no r/g appreciated, +systolic murmur  Lungs: CTAB anteriorly  Abdomen: soft, ND, +BS  MSK: no following commands for thorough MSK exam  Vascular: 2+ radial pulses, no edema, cool RLE versus LLE  Lymph: No gross cervical JAMEE  Neuro: GCS [id number]    Medications:   aspirin  81 mg Oral Daily    clopidogrel  75 mg Oral Daily    coenzyme Q10  1 capsule Oral Daily    duloxetine  60 mg Oral QHS    ferrous sulfate  325 mg Oral BID    fluticasone  2 spray Each Nare Daily    hydrALAZINE  25 mg Oral Q6H    isosorbide dinitrate  10 mg Oral Q6H    levothyroxine  25 mcg Oral Daily    lidocaine HCL 10 mg/ml (1%)  1 mL Other Once     multivit-iron-FA-calcium-mins  1 tablet Oral Daily    omega-3 fatty acids-fish oil  1 capsule Oral Daily    pantoprazole  40 mg Oral Daily    pravastatin  80 mg Oral QHS    senna-docusate 8.6-50 mg  1 tablet Oral Daily    sodium chloride 0.9%  3 mL Intravenous Q8H    Temazepam (Restoril) 30 mg capsule   (Pt's own supply)  30 mg Oral QHS    vitamin D  1,000 Units Oral Daily      DOBUTamine 5 mcg/kg/min (04/19/17 1300)    furosemide (LASIX) 5 mg/mL infusion (titrating) 15 mg/hr (04/19/17 1320)    heparin (porcine) in D5W 17 Units/kg/hr (04/19/17 1300)     acetaminophen, aluminum-magnesium hydroxide-simethicone, dextrose 50%, dextrose 50%, glucagon (human recombinant), glucose, glucose, levalbuterol, morphine, ondansetron, ondansetron, polyethylene glycol, predniSONE, ramelteon, simethicone    Review of patient's allergies indicates:   Allergen Reactions    Iodinated contrast media - oral and iv dye     Lipitor [atorvastatin] Other (See Comments)    Ciprofloxacin Nausea And Vomiting       Labs:  CBC with Diff:     Recent Labs  Lab 04/17/17  1401 04/18/17  0444   WBC 8.17 8.10   HGB 10.4* 9.6*   HCT 33.6* 30.8*    263   LYMPH 56.4*  4.6 49.4*  4.0   MONO 8.7  0.7 11.7  1.0   EOSINOPHIL 0.5 0.9       COAG:    Recent Labs  Lab 04/17/17  1401 04/18/17  0444 04/19/17  0725   APTT 27.1  --   --    INR 1.3* 1.5* 1.3*       CMP:   Recent Labs  Lab 04/17/17  1401 04/18/17  0444 04/19/17  0505   * 220* 215*   CALCIUM 9.3 9.0 9.2   ALBUMIN 2.8* 2.8* 3.0*   PROT 6.7 6.5 7.0    135* 133*   K 4.3 3.7 4.7   CO2 28 27 22*   CL 98 96 94*   BUN 41* 44* 50*   CREATININE 1.5* 1.6* 2.4*   ALKPHOS 116 109 110   * 158* 139*   AST 76* 61* 62*   BILITOT 1.2* 1.3* 1.5*   MG 1.8 1.5* 2.1     Estimated Creatinine Clearance: 20.7 mL/min (based on Cr of 2.4).    .  Recent Labs  Lab 04/18/17 2056   BNP 3231*         Diagnostic Results:  Ejection Fractions   EF   Date Value Ref Range Status   04/17/2017 20  (A) 55 - 65         Infectious disease labs:  Microbiology Results (last 7 days)     ** No results found for the last 168 hours. **          Assessment and Plan:  Millie Thompson is a 81 y.o. female who was transferred to CMICU from medicine with concern for cardiogenic shock.    CAD, post-ACS, VSD, acute systolic HF  - LHC 4/7: occluded vein graft to the LAD, AK apex, and an apical muscular VSD  - ECHO 4/17: EF 20%, moderate MR/TR/AS, PASP 64, and apical VSD  - CTS and INV cardiology has declined to do VSD repair  - attending discussion with family: DNR/DNI, no escalation of care   - aspirin, clopidogrel, pravastatin (allergy to atorvastatin)  - consider d/c of IV UFH  -  5 mcg/kg/min  - IV Lasix gtt 15 mg/h    Decreased LOC  - systemic more likely than primary neurologic though exam limited 2/2 lethargy  - global ischemia, CHF on ddx  - trend LFTs; ABGs prn  - monitor renal function panel  - monitor for infection     Paroxsymal AF  - hold sotalol in the setting of ADHF    DEBORAH  - AGAP present but ABG c/w acute (uncompensated) primary respiratory alkalosis  - in the setting of ADHF  - monitor on Lasix and inotropy  - patient urinating   - f/u lactic acid    HTN  - hydralazine + ISDN  - hold BB and ARB    Hypothyroidism  - TSH normal 4/18  - LT4    DM  - SSI    FEN/PPX:  IVFs: no cIVFs  Replete electrolytes prn  Nutrition: NPO  PPX: IV UFH    Shayne Guaman MD

## 2017-04-19 NOTE — ASSESSMENT & PLAN NOTE
-currently denies chest pain  -LHC at [place] 4/7 with 80-90% LM stenosis, 95% ostial and total occlusion proximal LAD, 70% stenosis of proximal RCA, patent saphenous vein graft to RCA, patent saphenous vein graft w/ Y to 1st to diag and 2nd obtuse, 50% stenosis of saphenous vein great to obtuse marginal, total occlusion of saphenous vein graft to LAD, large apical akinetic segment, apical VSD  -continue asa and statin  -interventional cardiology and CTS consulted, CTS has turned her down for surgery.  Interventional has made the patient DNR and will be tuned up overnight in the ICU and back to the floor in am.

## 2017-04-19 NOTE — SUBJECTIVE & OBJECTIVE
Interval History: Sleepy but arousable, she states her breathing is okay.  Daughter at bedside, updated on condition and plan for transfer to ICU.  She cannot recall how much she urinated overnight.    Review of Systems   Constitutional: Positive for activity change and fatigue (took benadryl for dye prep). Negative for chills, diaphoresis, fever and unexpected weight change (seems to be gaining weight? scales different vs bedscale? ).   HENT: Negative for congestion, sinus pressure and trouble swallowing.    Eyes: Negative.    Respiratory: Negative for cough, chest tightness, shortness of breath and wheezing.    Cardiovascular: Positive for leg swelling. Negative for chest pain and palpitations.   Gastrointestinal: Positive for constipation (due to iron supplementation ). Negative for abdominal distention, abdominal pain, blood in stool, diarrhea, nausea and vomiting.   Endocrine: Negative.    Genitourinary: Negative.  Negative for decreased urine volume and difficulty urinating.   Musculoskeletal: Negative.    Skin: Negative.    Allergic/Immunologic: Negative.    Neurological: Negative for dizziness, syncope, light-headedness and headaches.   Hematological: Negative.    Psychiatric/Behavioral: Positive for confusion (daughter states confused train of thoughts at times.) and decreased concentration.     Objective:     Vital Signs (Most Recent):  Temp: 96.5 °F (35.8 °C) (04/19/17 0800)  Pulse: 83 (04/19/17 1200)  Resp: 17 (04/19/17 1200)  BP: (!) 166/79 (04/19/17 1200)  SpO2: 97 % (04/19/17 1200) Vital Signs (24h Range):  Temp:  [96.3 °F (35.7 °C)-97.7 °F (36.5 °C)] 96.5 °F (35.8 °C)  Pulse:  [72-83] 83  Resp:  [17-40] 17  SpO2:  [91 %-97 %] 97 %  BP: (109-166)/(71-95) 166/79     Weight: 97.1 kg (214 lb 1.1 oz)  Body mass index is 37.92 kg/(m^2).    Intake/Output Summary (Last 24 hours) at 04/19/17 1418  Last data filed at 04/19/17 1300   Gross per 24 hour   Intake           859.15 ml   Output              750 ml    Net           109.15 ml      Physical Exam   Constitutional: She appears well-developed and well-nourished. No distress.   HENT:   Head: Normocephalic and atraumatic.   Mouth/Throat: Oropharynx is clear and moist.   Eyes: Conjunctivae and EOM are normal.   Neck: JVD present.   Cardiovascular: Normal rate, regular rhythm and intact distal pulses.  Exam reveals no gallop and no friction rub.    Murmur (mitral area II/VI) heard.  Pulmonary/Chest: Effort normal. She has no decreased breath sounds. She has wheezes (inspiratory wheezing noted with faint crackles). She has rales (faint bibasilar ). She exhibits no tenderness.   Abdominal: Soft. Bowel sounds are normal. She exhibits no distension and no mass. There is no tenderness. There is no rebound and no guarding.   Musculoskeletal: She exhibits edema (ble/ bue). She exhibits no tenderness.   Neurological: No cranial nerve deficit.   Lethargic from benadryl.   Skin: Skin is dry. No rash noted. She is not diaphoretic. No erythema. There is pallor.   Cool lower extremities, warm uppers, which is improved from yesterday, where she was cold head to toe.   Psychiatric:   Flat affect, daughter states she's having in appropriate conversation's, discombobulated thought processes, this am she's lethargic but arousable.    Nursing note and vitals reviewed.      Significant Labs:   BMP:   Recent Labs  Lab 04/19/17  0505   *   *   K 4.7   CL 94*   CO2 22*   BUN 50*   CREATININE 2.4*   CALCIUM 9.2   MG 2.1     CBC:   Recent Labs  Lab 04/18/17  0444   WBC 8.10   HGB 9.6*   HCT 30.8*        CMP:   Recent Labs  Lab 04/18/17  0444 04/19/17  0505   * 133*   K 3.7 4.7   CL 96 94*   CO2 27 22*   * 215*   BUN 44* 50*   CREATININE 1.6* 2.4*   CALCIUM 9.0 9.2   PROT 6.5 7.0   ALBUMIN 2.8* 3.0*   BILITOT 1.3* 1.5*   ALKPHOS 109 110   AST 61* 62*   * 139*   ANIONGAP 12 17*   EGFRNONAA 30.2* 18.5*       Significant Imaging: I have reviewed all pertinent  imaging results/findings within the past 24 hours.

## 2017-04-19 NOTE — ASSESSMENT & PLAN NOTE
- continue ASA and statin   -on heparin gtt for sub therapeutic INR in setting of Hx pAFIB, consider resuming warfarin, unless she's too obtunded

## 2017-04-19 NOTE — PLAN OF CARE
Problem: Patient Care Overview  Goal: Plan of Care Review  Outcome: Ongoing (interventions implemented as appropriate)  Pt had intense gas/abdominal pain during the beginning of the evening. She is on 3L NC less complaints of SOB overnight compared to last.  She was able to sleep, with contract allergy prep (benadryl) She is very hard to arouse for more than a couple minutes. She had been NPO before midnight for percutaneous VSD repair.   She had a small bowel movement this morning.    VS and assessment per flow sheet, patient progressing towards goals as tolerated, plan of care reviewed with Millie Thompson and family, all concerns addressed, will continue to monitor.           Problem: Diabetes, Type 2 (Adult)  Goal: Signs and Symptoms of Listed Potential Problems Will be Absent, Minimized or Managed (Diabetes, Type 2)  Signs and symptoms of listed potential problems will be absent, minimized or managed by discharge/transition of care (reference Diabetes, Type 2 (Adult) CPG).   Outcome: Ongoing (interventions implemented as appropriate)  Pt blood glucose monitored ACHS. Insulin provided as needed.

## 2017-04-20 NOTE — PROCEDURES
"Millie Thompson is a 81 y.o. female patient.    Temp: 97.7 °F (36.5 °C) (04/20/17 1100)  Pulse: 101 (04/20/17 1100)  Resp: (!) 21 (04/20/17 1100)  BP: (!) 150/82 (04/20/17 1100)  SpO2: 99 % (04/20/17 1100)  Weight: 96.8 kg (213 lb 6.5 oz) (04/20/17 0500)  Height: 5' 3" (160 cm) (04/17/17 1100)    PICC  Date/Time: 4/20/2017 12:05 PM  Performed by: ANASTACIA BANERJEE  Consent Done: Yes  Time out: Immediately prior to procedure a time out was called to verify the correct patient, procedure, equipment, support staff and site/side marked as required  Indications: med administration and vascular access  Anesthesia: local infiltration  Local anesthetic: lidocaine 1% without epinephrine  Anesthetic Total (mL): 3  Preparation: skin prepped with ChloraPrep  Skin prep agent dried: skin prep agent completely dried prior to procedure  Sterile barriers: all five maximum sterile barriers used - cap, mask, sterile gown, sterile gloves, and large sterile sheet  Hand hygiene: hand hygiene performed prior to central venous catheter insertion  Location details: right brachial  Catheter type: double lumen  Catheter size: 5 Fr  Catheter Length: 37cm    Ultrasound guidance: yes  Vessel Caliber: medium and patent, compressibility normal  Vascular Doppler: not done  Needle advanced into vessel with real time Ultrasound guidance.  Guidewire confirmed in vessel.  Image recorded and saved.  Sterile sheath used.  no esophageal manometryNumber of attempts: 1  Post-procedure: blood return through all ports, chlorhexidine patch and sterile dressing applied  Technical procedures used: 3CG  Specimens: No  Implants: No  Assessment: placement verified by x-ray  Complications: none        Karina San  4/20/2017  "

## 2017-04-20 NOTE — PLAN OF CARE
Problem: Patient Care Overview  Goal: Plan of Care Review  Outcome: Ongoing (interventions implemented as appropriate)  Patient stable throughout the day. No c/o pain/discomfort. Continues on nasal cannula. Cordis and TLC d/c post PICC insertion. Continues on lasix, dobutrex, and heparin drips. Calabrese with minimal urine output noted. Very poor appetite and family states has had a poor appetite for some time now. Dietary consult. Family at bedside throughout the day and updated on status and plan of care

## 2017-04-20 NOTE — PROGRESS NOTES
LOS: 3 days     24h events and S:  Millie Thompson is a 81 y.o. female who became alert and oriented at 4A today. No acute events. She does not want to be on dialysis and is DNR.     O:  Vitals:  Temp: 97.7 °F (36.5 °C) (04/20/17 1100)  Pulse: 101 (04/20/17 1100)  Resp: (!) 21 (04/20/17 1100)  BP: (!) 150/82 (04/20/17 1100)  SpO2: 99 % (04/20/17 1100)    Ins/Outs:    Intake/Output Summary (Last 24 hours) at 04/20/17 1102  Last data filed at 04/20/17 1100   Gross per 24 hour   Intake           741.61 ml   Output             1285 ml   Net          -543.39 ml       Physical Exam:  General: alert and oriented, conversant  Heart: RRR, no r/g appreciated  Lungs: CTAB anteriorly  Abdomen: soft, NT, ND, +BS  Vascular: 2+ radial pulse, no edema, warmer extremities today    Medications:   aspirin  81 mg Oral Daily    clopidogrel  75 mg Oral Daily    hydrALAZINE  25 mg Oral Q6H    isosorbide dinitrate  10 mg Oral Q6H    levothyroxine  25 mcg Oral Daily    pantoprazole  40 mg Oral Daily    pravastatin  80 mg Oral QHS    senna-docusate 8.6-50 mg  1 tablet Oral Daily    sodium chloride 0.9%  3 mL Intravenous Q8H      DOBUTamine 5 mcg/kg/min (04/20/17 1100)    furosemide (LASIX) 5 mg/mL infusion (titrating) 15 mg/hr (04/20/17 1100)    heparin (porcine) in D5W 13 Units/kg/hr (04/20/17 1100)     acetaminophen, dextrose 50%, dextrose 50%, dextrose 50%, dextrose 50%, glucagon (human recombinant), glucagon (human recombinant), glucose, glucose, glucose, glucose, insulin aspart, levalbuterol, ondansetron, ondansetron, polyethylene glycol, simethicone    Review of patient's allergies indicates:   Allergen Reactions    Iodinated contrast media - oral and iv dye     Lipitor [atorvastatin] Other (See Comments)    Ciprofloxacin Nausea And Vomiting       Labs:  CBC with Diff:     Recent Labs  Lab 04/17/17  1401 04/18/17  0444 04/20/17  0329   WBC 8.17 8.10 8.17   HGB 10.4* 9.6* 9.5*   HCT 33.6* 30.8* 30.5*    263 224    LYMPH 56.4*  4.6 49.4*  4.0 17.5*  1.4   MONO 8.7  0.7 11.7  1.0 9.1  0.7   EOSINOPHIL 0.5 0.9 0.0       COAG:    Recent Labs  Lab 04/17/17  1401 04/18/17  0444 04/19/17  0725   APTT 27.1  --   --    INR 1.3* 1.5* 1.3*       CMP:   Recent Labs  Lab 04/18/17  0444 04/19/17  0505 04/20/17  0329   * 215* 295*   CALCIUM 9.0 9.2 8.8   ALBUMIN 2.8* 3.0* 2.7*   PROT 6.5 7.0 6.2   * 133* 134*   K 3.7 4.7 4.0   CO2 27 22* 27   CL 96 94* 94*   BUN 44* 50* 60*   CREATININE 1.6* 2.4* 2.9*   ALKPHOS 109 110 89   * 139* 104*   AST 61* 62* 39   BILITOT 1.3* 1.5* 1.3*   MG 1.5* 2.1 1.9     Estimated Creatinine Clearance: 17.1 mL/min (based on Cr of 2.9).    .  Recent Labs  Lab 04/18/17  2056   BNP 3231*         Diagnostic Results:  Ejection Fractions   EF   Date Value Ref Range Status   04/17/2017 20 (A) 55 - 65         Infectious disease labs:  Microbiology Results (last 7 days)     ** No results found for the last 168 hours. **          Assessment and Plan:  Millie Thompson is a 81 y.o. female who was transferred to CMICU from medicine with concern for cardiogenic shock.     CAD, post-ACS, VSD, acute systolic HF  - LHC 4/7: occluded vein graft to the LAD, AK apex, and an apical muscular VSD  - ECHO 4/17: EF 20%, moderate MR/TR/AS, PASP 64, and apical VSD  - CTS and INV cardiology has declined to do VSD repair  - attending discussion with family: DNR/DNI, no escalation of care   - aspirin, clopidogrel, pravastatin (allergy to atorvastatin), hydralazine + ISDN  - CVP 13; CXR with pulmonary edema today though improved aeration of the RLL  - place PICC line and remove Cordis/MAC  - no ACEi or ARB 2/2 DEBORAH  - lactic acid normal  -  5 mcg/kg/min  - IV Lasix gtt 15 mg/h     Paroxsymal AF  - hold sotalol in the setting of ADHF  - IV UFH      DEBORAH  - AGAP present but ABG c/w acute (uncompensated) primary respiratory alkalosis  - in the setting of ADHF  - monitor on Lasix and inotropy  - patient urinating       HTN  - hydralazine + ISDN  - hold BB and ARB     Anemia  - monitor while on AC    Hypothyroidism  - TSH normal 4/18  - LT4     DM  - SSI     FEN/PPX:  IVFs: no cIVFs  Replete electrolytes prn  Nutrition: cardiac/renal/diabetic  PPX: IV UFH    *PT/OT     Shayne Guaman MD

## 2017-04-20 NOTE — CONSULTS
Double lumen PICC placed to right brachial vein.  37cm in length, 35cm arm circumference, 0cm exposed.  Lot# [id number].

## 2017-04-20 NOTE — PROCEDURES
CENTRAL VENOUS LINE INSERTION:    Antisepsis: sterile gloves, mask, gown, and drape.  Skin prepped with chlorhexidine.  Catheter: Cordis/MAC via right internal jugular.  Insertion directed by ultrasound.  Heme positive aspiration all ports.  Secured with sutures.  Dressing: dry sterile gauze and bio occlusive dressing.  Complications: None.

## 2017-04-20 NOTE — PLAN OF CARE
Problem: Patient Care Overview  Goal: Plan of Care Review  Outcome: Ongoing (interventions implemented as appropriate)  No changes overnight, vss. Remains on dobutamine, heparin and lasix htts. uop 40-50 ml/hr. Pt. More alert/awake this am. Plan of care reviewed with Mrs. Thompson and daughter, questions/concerns addressed, will continue to monitor pt.

## 2017-04-21 NOTE — PT/OT/SLP EVAL
"Physical Therapy  Evaluation    Millie Thompson   MRN: [id number]   Admitting Diagnosis: Acute MI, anterior wall, subsequent episode of care    PT Received On: 04/21/17  PT Start Time: 1038     PT Stop Time: 1058    PT Total Time (min): 20 min       Billable Minutes:  Evaluation 20    Diagnosis: Acute MI, anterior wall, subsequent episode of care    Past Medical History:   Diagnosis Date    A-fib     Anticoagulant long-term use     Arthritis     Asthma     Coronary artery disease     Diabetes mellitus     History of migraine headaches     complex migrains    Hypertension     Migraine     Neuropathy of foot     both feet    MARY (obstructive sleep apnea)     Stroke     Thyroid disease     Unspecified sleep apnea     wears nasal cannula with 2.5 lt O2      Past Surgical History:   Procedure Laterality Date    BACK SURGERY      bilateral arthroscope of knees      CARDIAC SURGERY      CATARACT EXTRACTION W/ INTRAOCULAR LENS  IMPLANT, BILATERAL      HYSTERECTOMY      ROTATOR CUFF REPAIR      Bilateral repairs       Referring physician: Shayne Guaman MD  Date referred to PT: 4/20/2017    General Precautions: Standard, fall      Do you have any cultural, spiritual, Cheondoism conflicts, given your current situation?: None    Patient History:  Lives With: child(mirza), adult  Living Arrangements: house  Transportation Available: family or friend will provide  Living Environment Comment: Pt lives with her son in a [facility] with 0 EAGLE. PTA to OSH in early April, she was independent with all mobility. At the previous hospital, she was using a RW for mobility. Her son works so she is alone at times.   Equipment Currently Used at Home: bedside commode, wheelchair, walker, rolling    Previous Level of Function:  Ambulation Skills: independent  Transfer Skills: independent  ADL Skills: independent  Work/Leisure Activity: independent    Subjective:  Communicated with RN prior to session.  Pt reported "I am ready to get " "up." Family reported she was doing well at home until this hospitalization.   Chief Complaint: Weakness  Patient goals: To get stronger, get out of bed    Pain Ratin/10     Objective:   Patient found with: telemetry, pulse ox (continuous), blood pressure cuff, PICC line, oliveira catheter     Cognitive Exam:  Oriented to: Person, Place, Time and Situation    Follows Commands/attention: Follows multistep  commands  Communication: clear/fluent  Safety awareness/insight to disability: intact    Physical Exam:  Postural examination/scapula alignment: Rounded shoulder, Head forward, Posterior pelvic tilt and Kyphosis    Skin integrity: Visible skin intact  Edema: None noted     Sensation:   Intact    Lower Extremity Range of Motion:  Right Lower Extremity: WFL  Left Lower Extremity: WFL    Lower Extremity Strength:  Right Lower Extremity: 3+/5 throughout  Left Lower Extremity: 3+/5 throughout    Gross motor coordination: Impaired    Functional Mobility:  Bed Mobility:  Supine to Sit: Minimum Assistance    Transfers:  Sit <> Stand Assistance: Moderate Assistance  Sit <> Stand Assistive Device: No Assistive Device  Bed <> Chair Technique: Stand Pivot  Bed <> Chair Assistance: Moderate Assistance  Bed <> Chair Assistive Device:  (hand held assist x 2)    Gait:   Gait Distance: 3 steps from bed to chair  Assistance 1: Moderate assistance  Gait Assistive Device: Hand held assist (x2)  Gait Pattern: 2-point gait  Gait Deviation(s): increased time in double stance, decreased step length    Balance:   Static Sit: POOR+: Needs MINIMAL assist to maintain  Dynamic Sit: POOR: N/A  Static Stand: POOR: Needs MODERATE assist to maintain  Dynamic stand: POOR: N/A    AM-PAC 6 CLICK MOBILITY  How much help from another person does this patient currently need?   1 = Unable, Total/Dependent Assistance  2 = A lot, Maximum/Moderate Assistance  3 = A little, Minimum/Contact Guard/Supervision  4 = None, Modified " Romance/Independent    Turning over in bed (including adjusting bedclothes, sheets and blankets)?: 3  Sitting down on and standing up from a chair with arms (e.g., wheelchair, bedside commode, etc.): 2  Moving from lying on back to sitting on the side of the bed?: 3  Moving to and from a bed to a chair (including a wheelchair)?: 2  Need to walk in hospital room?: 2  Climbing 3-5 steps with a railing?: 1  Total Score: 13     AM-PAC Raw Score CMS G-Code Modifier Level of Impairment Assistance   6 % Total / Unable   7 - 9 CM 80 - 100% Maximal Assist   10 - 14 CL 60 - 80% Moderate Assist   15 - 19 CK 40 - 60% Moderate Assist   20 - 22 CJ 20 - 40% Minimal Assist   23 CI 1-20% SBA / CGA   24 CH 0% Independent/ Mod I     Patient left up in chair with all lines intact, call button in reach and RN notified.    Assessment:   Millie Thompson is a 81 y.o. female with a medical diagnosis of Acute MI, anterior wall, subsequent episode of care and presents with decreased safety and independence with functional mobility. She would benefit from continued PT to progress mobility. Due to increased level of assist needed recommend SNF at discharge.    Rehab identified problem list/impairments: Rehab identified problem list/impairments: weakness, impaired endurance, impaired functional mobilty, impaired self care skills, gait instability, impaired balance, decreased lower extremity function, impaired cardiopulmonary response to activity, impaired coordination    Rehab potential is good.    Activity tolerance: Fair    Discharge recommendations: Discharge Facility/Level Of Care Needs: nursing facility, skilled     Barriers to discharge: Barriers to Discharge: Decreased caregiver support (at current functional level)    Equipment recommendations: Equipment Needed After Discharge: none (pt owns needed equipment)     GOALS:   Physical Therapy Goals        Problem: Physical Therapy Goal    Goal Priority Disciplines Outcome Goal  Variances Interventions   Physical Therapy Goal     PT/OT, PT Ongoing (interventions implemented as appropriate)     Description:  Goals to be met by: 2017     Patient will increase functional independence with mobility by performin. Supine to sit with Stand-by Assistance  2. Sit to supine with Stand-by Assistance  3. Sit to stand transfer with Minimal Assistance  4. Bed to chair transfer with Minimal Assistance using Rolling Walker  5. Gait  x 50 feet with Minimal Assistance using Rolling Walker.                 PLAN:    Patient to be seen 5 x/week to address the above listed problems via therapeutic exercises, therapeutic activities, gait training  Plan of Care expires: 17  Plan of Care reviewed with: patient, family    Shamikaica LeJeune, PT, DPT  [phone]

## 2017-04-21 NOTE — PLAN OF CARE
Problem: Physical Therapy Goal  Goal: Physical Therapy Goal  Goals to be met by: 2017     Patient will increase functional independence with mobility by performin. Supine to sit with Stand-by Assistance  2. Sit to supine with Stand-by Assistance  3. Sit to stand transfer with Minimal Assistance  4. Bed to chair transfer with Minimal Assistance using Rolling Walker  5. Gait x 50 feet with Minimal Assistance using Rolling Walker.   Outcome: Ongoing (interventions implemented as appropriate)  PT evaluation complete. Recommend SNF at discharge.

## 2017-04-21 NOTE — CONSULTS
[facility]  Adult Nutrition  Consult Note    SUMMARY     Recommendations    Recommendation/Intervention:   1. Recommend modifying diet order to Cardiac, 1800kcal Diabetic diet. Current diet is very restrictive and pt with poor PO. No need for renal diet at this time. Pts K is low and P WNL.   2. Encouraged good PO intake of meals. Made note of pts food preferences. Pt declines Boost. Will monitor.   Goals: PO intake >50%.   Nutrition Goal Status: new  Communication of RD Recs: reviewed with RN    Reason for Assessment    Reason for Assessment: nurse/nurse practitioner consult  Diagnosis: other (see comments) (VSD)  Relevent Medical History: CAD, HTN, HLD, DM     Nutrition Discharge Planning: Pt was provided education on previous visit. No further needs at this time.     Nutrition Prescription Ordered    Current Diet Order: Cardiac, Renal, Diabetic 2600kcal    Nutrition Risk Screen     Nutrition Risk Screen: no indicators present    Nutrition/Diet History    Typical Food/Fluid Intake: Pt reports poor PO intake. Eating 2-3 bites of meals.   Food Preferences: Made note of preferences in My Dining system.   Factors Affecting Nutritional Intake: decreased appetite    Labs/Tests/Procedures/Meds    Pertinent Labs Reviewed: reviewed  Pertinent Labs Comments: Na 133, K 3.3, Glu 300, Ca 8.5, Alb 2.7  Pertinent Medications Reviewed: reviewed  Pertinent Medications Comments: lasix    Physical Findings    Overall Physical Appearance: obese, weak  Oral/Mouth Cavity: tooth/teeth missing  Skin: intact    Anthropometrics    Height (inches): 62.99 in  Weight Method: Bed Scale  Weight (kg): 95 kg  Ideal Body Weight (IBW), Female: 114.95 lb  % Ideal Body Weight, Female (lb): 182.2   BMI (kg/m2): 37.11  BMI Grade: 35 - 39.9 - obesity - grade II    Estimated/Assessed Needs    Weight Used For Calorie Calculations: 95 kg (209 lb 7 oz)   Height (cm): 160 cm  Energy Need Method: [place]-St Jeor (1.2 PAL: 1672kcal)  RMR  (Wells-St. Armondor Equation): 1394.45  Weight Used For Protein Calculations: 95 kg (209 lb 7 oz)  Protein Requirements: 95-114g (1-1.2g/kg)  Fluid Need Method: RDA Method (or per MD)    Assessment and Plan    Inadequate energy intake r/t decreased appetite AEB PO intake <25% - new.     Monitor and Evaluation    Food and Nutrient Intake: energy intake, food and beverage intake  Food and Nutrient Adminstration: diet order  Anthropometric Measurements: weight, weight change  Biochemical Data, Medical Tests and Procedures: other (specify) (All labs)  Nutrition-Focused Physical Findings: overall appearance  % Intake of Estimated Energy Needs: 0 - 25 %  % Meal Intake: 25%    Nutrition Risk    Level of Risk: other (see comments) (2X/week)    Nutrition Follow-Up    RD Follow-up?: Yes

## 2017-04-21 NOTE — PLAN OF CARE
Problem: Patient Care Overview  Goal: Plan of Care Review  Outcome: Ongoing (interventions implemented as appropriate)  No acute events throughout day. See vital signs and assessments in flowsheets. See below for updates on today's progress.      Pulmonary: remains on 3L NC     Cardiovascular: ST 110s; BP WNL     Neurological: AAOx4 despite confusion noted last night; afebrile; moves all extremities     Gastrointestinal: appetite minimal     Genitourinary: oliveira intact with appropriate UOP     Endocrine: hyperglycemia treated with SSI     Integumentary/Other: worked with PT/OT to sit in chair; ecchymosis noted     Infusions: Heparin, Dobutamine, and Lasix gtts infusing     Patient progressing towards goals as tolerated, plan of care (step down) communicated and reviewed with Millie Thompson and family. All concerns addressed. Will continue to monitor.

## 2017-04-21 NOTE — PLAN OF CARE
Problem: Occupational Therapy Goal  Goal: Occupational Therapy Goal  Goals to be met by: 2 weeks 5/4/17     Patient will increase functional independence with ADLs by performing:    UE Dressing with Supervision.  LE Dressing with Supervision.  Grooming while standing with Supervision.  Toileting from toilet with Supervision for hygiene and clothing management.   Stand pivot transfers with Supervision.  Toilet transfer to toilet with Supervision.  Goals and POC established today.

## 2017-04-21 NOTE — PROGRESS NOTES
Hospital Day: 5    Subjective:    Interval History: Poor sleep overnight otherwise no overnight events.    Scheduled Meds:   aspirin  81 mg Oral Daily    clopidogrel  75 mg Oral Daily    diphenhydrAMINE  25 mg Oral Once    duloxetine  60 mg Oral Nightly    hydrALAZINE  25 mg Oral Q6H    isosorbide dinitrate  10 mg Oral Q6H    levothyroxine  25 mcg Oral Daily    pantoprazole  40 mg Oral Daily    potassium chloride  40 mEq Intravenous Once    pravastatin  80 mg Oral QHS    senna-docusate 8.6-50 mg  1 tablet Oral Daily    sodium chloride 0.9%  10 mL Intravenous Q6H    sodium chloride 0.9%  3 mL Intravenous Q8H     Continuous Infusions:   DOBUTamine 5 mcg/kg/min (04/21/17 0700)    furosemide (LASIX) 5 mg/mL infusion (titrating) 15 mg/hr (04/21/17 0700)    heparin (porcine) in D5W 7 Units/kg/hr (04/21/17 0700)     PRN Meds:acetaminophen, dextrose 50%, dextrose 50%, glucagon (human recombinant), glucose, glucose, insulin aspart, levalbuterol, ondansetron, ondansetron, polyethylene glycol, simethicone, Flushing PICC Protocol **AND** sodium chloride 0.9% **AND** sodium chloride 0.9%, zolpidem    Objective:  Vital signs in last 24 hours:   Temp:  [97.6 °F (36.4 °C)-97.8 °F (36.6 °C)] 97.8 °F (36.6 °C)  Pulse:  [] 108  Resp:  [19-36] 22  SpO2:  [95 %-100 %] 97 %  BP: (118-160)/(56-82) 127/58    Intake/Output last 3 shifts:  I/O last 3 completed shifts:  In: 1466 [P.O.:460; I.V.:1006]  Out: 2155 [Urine:2155]  Intake/Output this shift:  I/O this shift:  In: -   Out: 170 [Urine:170]    Physical Exam:  General Appearance:    Alert, cooperative, no distress   Lungs:     Clear to auscultation bilaterally, respirations unlabored    Heart:    JVP 10 cm H2O, Regular rate and rhythm, HS S1+S2+3/6 ejection systolic murmur heart throughout, loudest at RSB 2nd ICS, 3/6 pansystolic murmur heard at Left mid- upper back below scapula   Abdomen:     Soft, non-tender, bowel sounds active, no masses, no organomegaly    Extremities:   trace edema b/l, pulses +1 b/l         Lab Review  Lab Results   Component Value Date     (L) 04/21/2017     (L) 04/20/2017     (L) 04/19/2017    K 3.3 (L) 04/21/2017    K 4.0 04/20/2017    K 4.7 04/19/2017    CO2 29 04/21/2017    CO2 27 04/20/2017    CO2 22 (L) 04/19/2017    BUN 64 (H) 04/21/2017    BUN 60 (H) 04/20/2017    BUN 50 (H) 04/19/2017    CREATININE 2.7 (H) 04/21/2017    CREATININE 2.9 (H) 04/20/2017    CREATININE 2.4 (H) 04/19/2017    CALCIUM 8.5 (L) 04/21/2017    CALCIUM 8.8 04/20/2017    CALCIUM 9.2 04/19/2017     Lab Results   Component Value Date    WBC 10.01 04/21/2017    WBC 8.17 04/20/2017    WBC 8.10 04/18/2017    HGB 8.8 (L) 04/21/2017    HGB 9.5 (L) 04/20/2017    HGB 9.6 (L) 04/18/2017    HCT 28.7 (L) 04/21/2017    HCT 30.5 (L) 04/20/2017    HCT 30.8 (L) 04/18/2017    MCV 82 04/21/2017    MCV 82 04/20/2017    MCV 81 (L) 04/18/2017     04/21/2017     04/20/2017     04/18/2017        Assessment/Plan:        Active Hospital Problems    Diagnosis  POA    *Acute MI, anterior wall, subsequent episode of care complicated by acute VSD [I21.09]  Yes    Acute renal insufficiency [N28.9]  Yes    Elevated transaminase level [R74.0]  Yes    Hypertensive heart and kidney disease with heart failure [I13.0]  Yes    VSD (ventricular septal defect), muscular, acut post-anterior MI [Q21.0]  Not Applicable    Essential hypertension [I10]  Yes    Type 2 diabetes mellitus with diabetic neuropathy, with long-term current use of insulin [E11.40, Z79.4]  Yes    Hypothyroidism due to acquired atrophy of thyroid [E03.4]  Yes    Coronary artery disease involving coronary bypass graft of native heart without angina pectoris [I25.810]  Yes    MARY (obstructive sleep apnea) [G47.33]  Yes    Hx of paroxysmal a-fib [I48.0]  Yes    Anemia [D64.9]  Yes    GERD (gastroesophageal reflux disease) [K21.9]  Yes    Diabetic polyneuropathy [E11.42]  Yes      Resolved  Hospital Problems    Diagnosis Date Resolved POA   No resolved problems to display.     80 yo F female w ICM, DM2, HTN, pAFib on coumadin who was transferred to CMICU from medicine with concern for cardiogenic shock, post-ACS and new VSD      Cardiogenic shock w acute on chronic systolic HF  - LHC 4/7: occluded vein graft to the LAD, AK apex, and an apical muscular VSD  - ECHO 4/17: EF 20%, moderate MR/TR/AS, PASP 64, and apical VSD  - CTS and INV cardiology has declined to do VSD repair  - attending discussion with family: DNR/DNI, no escalation of care   - aspirin, clopidogrel, pravastatin (allergy to atorvastatin)  - increased hydralazine/isosorbide dinitrate to 50mg/20mg, if no HA tomorrow will increase isosorbide to 40mg  - CVP 14 yesterday, awaiting measurement today  - continue  5 mcg/kg/min  - continue IV Lasix gtt 15 mg/h,  - continue diuresis and then will consider re-evaluation of VSD if possible      Paroxsymal AF  - hold sotalol in the setting of ADHF  - IV UFH       DEBORAH  - Cr/BUN: 2.7/22  - baseline Cr: 1.5  - avoid nephrotoxic medication  - monitor daily BUN and Cr      HTN  - currently controlled  - continue hydralazine + ISDN        Normocytic Anemia  - no active bleed  - stable w H/H 9.6/28.7  - continue to monitor     Hypothyroidism  Pt is currently asymptomatic.  Denies cold intolerance, constipation, malaise, hair loss, depression, unexplained weight gain.  -continue home dose medication: levothyroxine 25 mcg daily        DM2   -BG in 300's, will change to calorie restricted cardiac diabetic diet per nutrition recs  -continue to monitor blood glucose with fingersticks qachs  -continue sliding scale   -monitor for hypoglycemia    Nutrition: Cardiac diabetic 1800 eh restriction diet  DVTP: on heparin gtt  Code: DNR    Grisel Liang MD  Cardiology Hospitalist  [facility]  SpectraLink: [id number]

## 2017-04-21 NOTE — PT/OT/SLP EVAL
"Occupational Therapy  Evaluation    Millie Thompson   MRN: [id number]   Admitting Diagnosis: Acute MI, anterior wall, subsequent episode of care    OT Date of Treatment: 17   OT Start Time: 0930  OT Stop Time: 1000  OT Total Time (min): 30 min    Billable Minutes:  Evaluation 30    Diagnosis: Acute MI, anterior wall, subsequent episode of care   Pt with acute VSD with severe LV dysfunction    Past Medical History:   Diagnosis Date    A-fib     Anticoagulant long-term use     Arthritis     Asthma     Coronary artery disease     Diabetes mellitus     History of migraine headaches     complex migrains    Hypertension     Migraine     Neuropathy of foot     both feet    MARY (obstructive sleep apnea)     Stroke     Thyroid disease     Unspecified sleep apnea     wears nasal cannula with 2.5 lt O2      Past Surgical History:   Procedure Laterality Date    BACK SURGERY      bilateral arthroscope of knees      CARDIAC SURGERY      CATARACT EXTRACTION W/ INTRAOCULAR LENS  IMPLANT, BILATERAL      HYSTERECTOMY      ROTATOR CUFF REPAIR      Bilateral repairs     General Precautions: Standard, fall  Orthopedic Precautions: N/A        Patient History:  Living Environment  Lives With: child(mirza), adult  Living Arrangements: house  Living Environment Comment: Pt lives with son in one story home. Pt reports she was independent before she recently became sick about one month ago. Pt reports she did not use AE/DME until recently started using a RW. Pt reports she is typically home alone at times when her son is working.   Equipment Currently Used at Home: bedside commode, wheelchair, rollator, walker, rolling      Subjective:  Communicated with nsg prior to session.  "I want to get to that chair" pt reports.     Pain Ratin/10     Objective:   Pt found supine in bed with several family members in room.     Cognitive Exam:  Pt awake and alert and orientated to self and place. Pt able to follow commands and " "demo appropriate affect and behavior.    Physical Exam:  Pt is right hand dominant and demo WFL B UE ROM/strength, intact light touch sensation and intact coordination.     Functional Mobility:  Bed Mobility:  Supine to Sit: Minimum Assistance    Transfers:  Sit <> Stand Assistance: Moderate Assistance  Sit <> Stand Assistive Device: No Assistive Device  Bed <> Chair Transfer Assistance: Moderate Assistance    Functional Ambulation: Few steps completed in room with MOD A and max cues for hand placement and safety with mobility.     Activities of Daily Living:     UE Dressing Level of Assistance: Total assistance  LE Dressing Level of Assistance: Total assistance  Grooming Position: Seated  Grooming Level of Assistance: Supervision     Education provided to pt re: OT POC and safety with functional mobility and ADL skills. Pt verb understanding but to benefit from further training.     AM-PAC 6 CLICK ADL  How much help from another person does this patient currently need?  1 = Unable, Total/Dependent Assistance  2 = A lot, Maximum/Moderate Assistance  3 = A little, Minimum/Contact Guard/Supervision  4 = None, Modified [place]/Independent    Putting on and taking off regular lower body clothing? : 1  Bathing (including washing, rinsing, drying)?: 1  Toileting, which includes using toilet, bedpan, or urinal? : 1  Putting on and taking off regular upper body clothing?: 1  Taking care of personal grooming such as brushing teeth?: 3  Eating meals?: 3  Total Score: 10    AM-PAC Raw Score CMS "G-Code Modifier Level of Impairment Assistance   6 % Total / Unable   7 - 9 CM 80 - 100% Maximal Assist   10 - 14 CL 60 - 80% Moderate Assist   15 - 19 CK 40 - 60% Moderate Assist   20 - 22 CJ 20 - 40% Minimal Assist   23 CI 1-20% SBA / CGA   24 CH 0% Independent/ Mod I       Patient left seated in b/s chair with all lines intact, call button in reach and nsg notified    Assessment:  Millie Thompson is a 81 y.o. female " with a medical diagnosis of Acute MI, anterior wall, subsequent episode of care and tolerated session fairly well. VSS during session. Pt is not safe to return home to prior living situation where she was home alone. Pt may benefit from t/f to SNF prior to d/c home pending functional progress.  Pt to benefit from cont OT to address stated goals.       Rehab identified problem list/impairments: Rehab identified problem list/impairments: weakness, impaired self care skills, impaired balance, impaired functional mobilty, impaired endurance, gait instability    Rehab potential is good.    Activity tolerance: Good    Discharge recommendations:   SNF      GOALS:   Occupational Therapy Goals        Problem: Occupational Therapy Goal    Goal Priority Disciplines Outcome Interventions   Occupational Therapy Goal     OT, PT/OT     Description:  Goals to be met by:  2 weeks 5/4/17     Patient will increase functional independence with ADLs by performing:    UE Dressing with Supervision.  LE Dressing with Supervision.  Grooming while standing with Supervision.  Toileting from toilet with Supervision for hygiene and clothing management.   Stand pivot transfers with Supervision.  Toilet transfer to toilet with Supervision.                PLAN:  Patient to be seen 5 x/week to address the above listed problems via self-care/home management, therapeutic activities, therapeutic exercises  Plan of Care expires:    Plan of Care reviewed with: patient, family         IRASEMA Blunt  04/21/2017

## 2017-04-21 NOTE — PLAN OF CARE
Problem: Patient Care Overview  Goal: Plan of Care Review  Outcome: Ongoing (interventions implemented as appropriate)  No changes overnight, vss. Remains on dobutamine, lasix and heparin. uop 40-50 cc/hr. Blood glucose 300s. Plan to step down when bed available. Plan of care reviewed with Mrs. Thompson question/concerns addressed, will continue to monitor pt.

## 2017-04-22 NOTE — NURSING
Called Dr Byers about Calabrese catheter removal. He stated to leave catheter in at this time and call back in an hour to speak with MD on call to verify catheter can remain in. Informed receiving nurse, Yolanda, on CSU of this message.

## 2017-04-22 NOTE — PROGRESS NOTES
Hospital Day: 6    Subjective:  Interval History:     The patient has had no overnight complaints.  The patient's nurse did not report any overnight events either.  No overnight events on telemetry monitoring.      Scheduled Meds:   aspirin  81 mg Oral Daily    clopidogrel  75 mg Oral Daily    diphenhydrAMINE  25 mg Oral Once    duloxetine  60 mg Oral Nightly    hydrALAZINE  50 mg Oral Q6H    isosorbide dinitrate  20 mg Oral Q6H    levothyroxine  25 mcg Oral Daily    pantoprazole  40 mg Oral Daily    pravastatin  80 mg Oral QHS    senna-docusate 8.6-50 mg  1 tablet Oral Daily    sodium chloride 0.9%  10 mL Intravenous Q6H    sodium chloride 0.9%  3 mL Intravenous Q8H     Continuous Infusions:   DOBUTamine 5 mcg/kg/min (04/21/17 2200)    furosemide (LASIX) 5 mg/mL infusion (titrating) 15 mg/hr (04/21/17 2200)    heparin (porcine) in D5W 6.969 Units/kg/hr (04/21/17 2200)     PRN Meds:acetaminophen, dextrose 50%, dextrose 50%, glucagon (human recombinant), glucose, glucose, insulin aspart, levalbuterol, ondansetron, ondansetron, polyethylene glycol, simethicone, Flushing PICC Protocol **AND** sodium chloride 0.9% **AND** sodium chloride 0.9%, zolpidem    Objective:  Vital signs in last 24 hours:   Temp:  [97 °F (36.1 °C)-98.6 °F (37 °C)] 98.6 °F (37 °C)  Pulse:  [111-118] 115  Resp:  [20-60] 35  SpO2:  [95 %-100 %] 96 %  BP: (109-168)/(55-93) 111/70    Intake/Output last 3 shifts:  I/O last 3 completed shifts:  In: 1831.2 [P.O.:960; I.V.:771.2; IV Piggyback:100]  Out: 3500 [Urine:3500]  Intake/Output yesterday AM last 3 shifts:  Out 2155       Physical Exam:  General Appearance:    Alert, cooperative, no distress   Lungs:     Clear to auscultation bilaterally, respirations unlabored    Heart:    No JVP, Regular rate and rhythm, HS S1+S2+3/6 ejection systolic at RSB; 3/6 holosystolic at 1/3 of upper back under scapula, unchanged from yesterday   Abdomen:     Soft, non-tender, bowel sounds active, no  masses, no organomegaly   Extremities:   no edema b/l, pulses +2 b/l       Lab Review    Lab Results   Component Value Date     04/22/2017     (L) 04/21/2017     (L) 04/20/2017    K 3.5 04/22/2017    K 3.3 (L) 04/21/2017    K 4.0 04/20/2017    CO2 35 (H) 04/22/2017    CO2 29 04/21/2017    CO2 27 04/20/2017    BUN 56 (H) 04/22/2017    BUN 64 (H) 04/21/2017    BUN 60 (H) 04/20/2017    CREATININE 2.3 (H) 04/22/2017    CREATININE 2.7 (H) 04/21/2017    CREATININE 2.9 (H) 04/20/2017    CALCIUM 8.8 04/22/2017    CALCIUM 8.5 (L) 04/21/2017    CALCIUM 8.8 04/20/2017     Lab Results   Component Value Date    WBC 7.49 04/22/2017    WBC 10.01 04/21/2017    WBC 8.17 04/20/2017    HGB 8.7 (L) 04/22/2017    HGB 8.8 (L) 04/21/2017    HGB 9.5 (L) 04/20/2017    HCT 29.1 (L) 04/22/2017    HCT 28.7 (L) 04/21/2017    HCT 30.5 (L) 04/20/2017    MCV 83 04/22/2017    MCV 82 04/21/2017    MCV 82 04/20/2017     04/22/2017     04/21/2017     04/20/2017        Assessment/Plan:      Active Hospital Problems    Diagnosis  POA    *Acute MI, anterior wall, subsequent episode of care complicated by acute VSD [I21.09]  Yes    Acute renal insufficiency [N28.9]  Yes    Elevated transaminase level [R74.0]  Yes    Hypertensive heart and kidney disease with heart failure [I13.0]  Yes    VSD (ventricular septal defect), muscular, acut post-anterior MI [Q21.0]  Not Applicable    Essential hypertension [I10]  Yes    Type 2 diabetes mellitus with diabetic neuropathy, with long-term current use of insulin [E11.40, Z79.4]  Yes    Hypothyroidism due to acquired atrophy of thyroid [E03.4]  Yes    Coronary artery disease involving coronary bypass graft of native heart without angina pectoris [I25.810]  Yes    MARY (obstructive sleep apnea) [G47.33]  Yes    Hx of paroxysmal a-fib [I48.0]  Yes    Anemia [D64.9]  Yes    GERD (gastroesophageal reflux disease) [K21.9]  Yes    Diabetic polyneuropathy [E11.42]  Yes       Resolved Hospital Problems    Diagnosis Date Resolved POA   No resolved problems to display.     BUN/Cr improved  K needs repletion    82 yo F female w ICM, DM2, HTN, pAFib on coumadin who was transferred to CMICU from medicine with concern for cardiogenic shock, post-ACS and new VSD      Cardiogenic shock w acute on chronic systolic HF  - attending discussion with family: DNR/DNI, no escalation of care   - continue aspirin, clopidogrel, pravastatin (allergy to atorvastatin)  - increased hydralazine/isosorbide dinitrate to 50mg/40mg  - continue  5 mcg/kg/min  - continue IV Lasix gtt 15 mg/h  -PA and lateral CXR to assess volume status  - continue diuresis and then will consider re-evaluation of VSD if Cr improves enough for possibility of tx      Paroxsymal AF  - hold sotalol in the setting of ADHF  - continue IV UFH, can bridge to Eliquis on discharge, meets criteria for reduced dose of 2.5 BID      Acute on chronic renal failure  - Cr/BUN: 2.3 from 2.7  - baseline Cr: 1.5  - avoid nephrotoxic medication  - monitor daily BUN and Cr      Essential HTN  - currently controlled  - continue hydralazine + ISDN, titrate complete     Normocytic Anemia  - no active bleed  - stable w H/H 9/29  - continue to monitor      Hypothyroidism  Pt is currently asymptomatic. Denies cold intolerance, constipation, malaise, hair loss, depression, unexplained weight gain.  -continue home dose medication: levothyroxine 25 mcg daily     DM2   -continue to monitor blood glucose with fingersticks qachs  -changed sliding scale to intermediate ss   -monitor for hypoglycemia     Nutrition: Cardiac diabetic 1800 eh restriction diet  DVTP: on heparin gtt  Code: DNR     Grisel Liang MD  Cardiology Hospitalist  [facility]  SpectraLink: [id number]

## 2017-04-22 NOTE — PLAN OF CARE
Problem: Patient Care Overview  Goal: Plan of Care Review  Outcome: Ongoing (interventions implemented as appropriate)  Lasix,  and Heparin gttx maintained during shift. VSS. Denies any CP, SOB, palpitations, or dizziness. Fall precautions maintained. Free of fall/trauma/injury. Turned/repositioned q 2 hrs. General skin remains intact with no breakdown. Calabrese remains in place, per Dr. Vanessa order. Plan is for ENT consult for nose bleeds. Plan of care reviewed and updated with patient, daughter and granddaughter, verbalizes understanding. Patient in no acute distress. Will continue to monitor.

## 2017-04-22 NOTE — NURSING TRANSFER
Nursing Transfer Note      4/22/2017     Transfer From: CSU rm 354 to CSU Rm 377    Transfer via bed    Transfer with O2, cardiac monitoring    Transported by RN x 2    Medicines sent: Heparin, Lasix and  gttx    Chart send with patient: Yes    Notified: Daughter and Niece at bedside    Patient reassessed at: 04/22/17 at 0030AM    Upon arrival to floor: cardiac monitor and O2 applied, patient oriented to room, call bell in reach and bed in lowest position.

## 2017-04-22 NOTE — NURSING TRANSFER
Nursing Transfer Note      4/22/2017     Transfer to 377    Transfer via bed    Transfer with tele  Transported by charge nurse  Medicines sent: yes  Chart send with patient:yes    Notified: family at bedside  Patient reassessed at: 04/21/2017 2245  Upon arrival to floor: oriented to room call bell in reach bed in lowest position

## 2017-04-23 NOTE — PROGRESS NOTES
FULL HTS NOTE TO FOLLOW FOR TODAY:  JVP approx 16 cm tonight rounds  Loud systolic murmur unchanged and max very lateral and posterior  Legs tender but no major edema  Cr slightly better  Discussed with 2 daughters as well as pt  Will try to increase lasix tonight and hope Cr continues to fall  If Cr does not improve significantly tomorrow then dtr would like for me to discuss Hospice with patient tomorrow  If going with hospice will taper off  over 4 hrs tomorrow to assure tolerates being off and comfortable enough to allow for home hospice off this treatment.  If not will resume  for palliation with Hospice

## 2017-04-23 NOTE — PROGRESS NOTES
Patient had jaw pain similar to prior MI and EKG of concern as more ST elevation  Given dilaudid and resolved  JVP above 18 cm  Some crackles  No change in murmur and no rub  No good response to increase in lasix  No improvement in renal function  Met with patient, dtr and grand-daughter and discussed options:   Keep here and work on diuresis and comfort, maintain DNR as not candidate for invasive procedures   Stop all meds and keep comfortable here and if able transfer home with Hospice   Hospice at home on  infusion and oral medications  She is not sure so will increase lasix infusion, try one dose metolazone 5 mg and follow response while she ponders this decision.  I elected to not track troponin or perform serial EKG's since results would not  in her case.

## 2017-04-23 NOTE — PLAN OF CARE
Problem: Patient Care Overview  Goal: Plan of Care Review  Outcome: Ongoing (interventions implemented as appropriate)  Pt free of falls/trauma/injuries this shift.  gtt infusing per MD orders. Lasix gtt increased per MD orders. Heparin gtt adjusted via Heparin nomogram per MD orders. Family at pt's bedside, MD notified pt and pt's family of different treatment options.  VSS. Pt voices no complaints of chest pain, SOB, or dizziness. Will continue to monitor.

## 2017-04-24 NOTE — PROGRESS NOTES
Pt D/C home with hospice per MD orders. [facility] up to transport pt home. Pt continued on 3.5 L NC of oxygen. Tele removed, IV access removed and intact X 1.  VSS, NAD, and no complaints at this time. Pt's daughter  given and explained med list and prescriptions. Pt's daughter verbalized complete understanding of all  D/C instructions and follow up care. Pt given printed AVS, signed and copy placed in Pt chart. Pt being taken home by [facility]. Will continue to monitor.

## 2017-04-24 NOTE — PLAN OF CARE
[facility]     Department of Hospital Medicine     [address] [place] [address]     [phone] after hours  [phone] fax                                   HOSPICE  ORDERS     04/24/2017    Admit to Hospice:  Home Service     Diagnoses:  Active Hospital Problems    Diagnosis  POA    *Acute MI, anterior wall, subsequent episode of care complicated by acute VSD [I21.09]  Yes    Acute renal insufficiency [N28.9]  Yes    Elevated transaminase level [R74.0]  Yes    Hypertensive heart and kidney disease with heart failure [I13.0]  Yes    VSD (ventricular septal defect), muscular, acut post-anterior MI [Q21.0]  Not Applicable    Essential hypertension [I10]  Yes    Type 2 diabetes mellitus with diabetic neuropathy, with long-term current use of insulin [E11.40, Z79.4]  Yes    Hypothyroidism due to acquired atrophy of thyroid [E03.4]  Yes    Coronary artery disease involving coronary bypass graft of native heart without angina pectoris [I25.810]  Yes    MARY (obstructive sleep apnea) [G47.33]  Yes    Hx of paroxysmal a-fib [I48.0]  Yes    Anemia [D64.9]  Yes    GERD (gastroesophageal reflux disease) [K21.9]  Yes    Diabetic polyneuropathy associated with type 2 diabetes mellitus [E11.42]  Yes      Resolved Hospital Problems    Diagnosis Date Resolved POA   No resolved problems to display.       Hospice Qualifying Diagnoses:        Patient has a life expectancy < 6 months due to the above conditions.    Vital Signs: Routine per Hospice Protocol.    Allergies:  Review of patient's allergies indicates:   Allergen Reactions    Iodinated contrast media - oral and iv dye     Lipitor [atorvastatin] Other (See Comments)    Ciprofloxacin Nausea And Vomiting       Diet: regular    Activities: As tolerated    Nursing: Per Hospice Routine    Future Orders:  Hospice Medical Director may dictate new orders for comfortable care measures & sign death  "certificate.    Medications:         Transition to Comfort Care Medications When Appropriate        Millie Thompson   Home Medication Instructions ERIC:[id number]    Printed on:04/24/17 1157   Medication Information                      NOVOFINE 32 32 gauge x 1/4" Ndle     STOP    4/24          NOVOLOG MIX 70-30 FLEXPEN 100 unit/mL (70-30) InPn pen  Inject 18 Units into the skin 2 (two) times daily.    STOP    4/24          omeprazole (PRILOSEC) 20 MG capsule  Take 20 mg by mouth once daily.     STOP    4/24          simethicone (MYLICON) 80 MG chewable tablet  Take 1 tablet (80 mg total) by mouth 3 (three) times daily as needed for Flatulence.   STOP    4/24                    _________________________________  Grisel Liang MD  04/24/2017  "

## 2017-04-24 NOTE — PROGRESS NOTES
Hospital Day: 8    Subjective:  Interval History:     Pt has decided to go home with hospice care.    Decision made last night NOT to track troponin or perform serial EKGs as management will not change.    Brief run of Afib last night converted to sinus rhythm on her own.   Given 1 dose of metolazone and increased lasix to 40 mg/hour just to see if possibly would improve renal function     Scheduled Meds:   aspirin  81 mg Oral Daily    clopidogrel  75 mg Oral Daily    diphenhydrAMINE  25 mg Oral Once    duloxetine  60 mg Oral Nightly    fluticasone  2 spray Each Nare Daily    hydrALAZINE  50 mg Oral Q6H    isosorbide dinitrate  20 mg Oral Q6H    levothyroxine  25 mcg Oral Daily    pantoprazole  40 mg Oral Daily    pravastatin  80 mg Oral QHS    senna-docusate 8.6-50 mg  1 tablet Oral Daily    sodium chloride 0.9%  10 mL Intravenous Q6H    sodium chloride 0.9%  3 mL Intravenous Q8H     Continuous Infusions:   DOBUTamine 5 mcg/kg/min (04/23/17 1720)    furosemide (LASIX) 5 mg/mL infusion (titrating) 40 mg/hr (04/24/17 0344)    heparin (porcine) in D5W 9 Units/kg/hr (04/24/17 0221)     PRN Meds:acetaminophen, dextrose 50%, dextrose 50%, glucagon (human recombinant), glucose, glucose, HYDROmorphone, insulin aspart, levalbuterol, lorazepam, ondansetron, ondansetron, polyethylene glycol, simethicone, Flushing PICC Protocol **AND** sodium chloride 0.9% **AND** sodium chloride 0.9%, zolpidem    Objective:  Vital signs in last 24 hours:   Temp:  [97.6 °F (36.4 °C)] 97.6 °F (36.4 °C)  Pulse:  [102-119] 119  Resp:  [14-18] 14  SpO2:  [94 %-96 %] 95 %  BP: (101-118)/(63-77) 111/72    Intake/Output last 3 shifts:  I/O last 3 completed shifts:  In: 1277.2 [P.O.:500; I.V.:777.2]  Out: 2225 [Urine:2225]    Physical Exam:  General Appearance:  Alert, cooperative, no distress   Lungs:  Clear to auscultation bilaterally, respirations unlabored   Heart:  JVP 16 cm H2O, Regular rate and rhythm, HS S1+S2+3/6 ejection  systolic at RSB; 3/6 holosystolic at 1/3 of upper back under scapula, unchanged from yesterday   Abdomen:  Soft, non-tender, bowel sounds active, no masses, no organomegaly   Extremities: no edema b/l, pulses +2 b/l     Lab Review  Lab Results   Component Value Date     (L) 04/24/2017     04/23/2017     04/22/2017    K 3.4 (L) 04/24/2017    K 3.3 (L) 04/23/2017    K 3.5 04/22/2017    CO2 33 (H) 04/24/2017    CO2 32 (H) 04/23/2017    CO2 35 (H) 04/22/2017    BUN 55 (H) 04/24/2017    BUN 53 (H) 04/23/2017    BUN 56 (H) 04/22/2017    CREATININE 2.5 (H) 04/24/2017    CREATININE 2.3 (H) 04/23/2017    CREATININE 2.3 (H) 04/22/2017    CALCIUM 9.1 04/24/2017    CALCIUM 8.9 04/23/2017    CALCIUM 8.8 04/22/2017     Lab Results   Component Value Date    WBC 7.30 04/24/2017    WBC 6.05 04/23/2017    WBC 7.49 04/22/2017    HGB 8.4 (L) 04/24/2017    HGB 8.5 (L) 04/23/2017    HGB 8.7 (L) 04/22/2017    HCT 28.6 (L) 04/24/2017    HCT 27.2 (L) 04/23/2017    HCT 29.1 (L) 04/22/2017    MCV 83 04/24/2017    MCV 81 (L) 04/23/2017    MCV 83 04/22/2017     04/24/2017     04/23/2017     04/22/2017        Assessment/Plan:    Active Hospital Problems    Diagnosis  POA    *Acute MI, anterior wall, subsequent episode of care complicated by acute VSD [I21.09]  Yes    Acute renal insufficiency [N28.9]  Yes    Elevated transaminase level [R74.0]  Yes    Hypertensive heart and kidney disease with heart failure [I13.0]  Yes    VSD (ventricular septal defect), muscular, acut post-anterior MI [Q21.0]  Not Applicable    Essential hypertension [I10]  Yes    Type 2 diabetes mellitus with diabetic neuropathy, with long-term current use of insulin [E11.40, Z79.4]  Yes    Hypothyroidism due to acquired atrophy of thyroid [E03.4]  Yes    Coronary artery disease involving coronary bypass graft of native heart without angina pectoris [I25.810]  Yes    MARY (obstructive sleep apnea) [G47.33]  Yes    Hx of  paroxysmal a-fib [I48.0]  Yes    Anemia [D64.9]  Yes    GERD (gastroesophageal reflux disease) [K21.9]  Yes    Diabetic polyneuropathy associated with type 2 diabetes mellitus [E11.42]  Yes      Resolved Hospital Problems    Diagnosis Date Resolved POA   No resolved problems to display.     80 yo F female w ICM, DM2, HTN, pAFib on coumadin who was transferred to CMICU from medicine with concern for cardiogenic shock, post-ACS and new VSD     Jaw pain  Pain free after she was switched to dilaudid   -no serial EKG or troponins      Cardiogenic shock w acute on chronic systolic HF  - pt has decided that she would like to go home with hospice  - attending discussion with family: DNR/DNI, no escalation of care, will go home on hospice   - continue aspirin clopidogrel, pravastatin (allergy to atorvastatin)  - decreased isosorbide dinitrate to 20mg   - wean , will change to 2.5 mcg/kg/min  - will wean lasix to 20mg, pt's family thought that she may want it for comfort so will keep it on for now  - will coordinate with  regarding hospice      Paroxsymal AF  - hold sotalol in the setting of ADHF  - continue IV UFH for now, once attending sees her and hospice confirmed, will stop all meds      Acute on chronic renal failure  - Cr/BUN: bumped to 2.5 today from 2.3  - baseline Cr: 1.5  - once attending sees her and hospice confirmed, will stop all meds      Essential HTN  - currently controlled  - continue hydralazine + ISDN at current dose, no complaint of HA  - once attending sees her and hospice confirmed, will stop all meds      Normocytic Anemia  - no active bleed  -once attending sees her and hospice confirmed, will stop all lab draws      Hypothyroidism  Pt is currently asymptomatic. Denies cold intolerance, constipation, malaise, hair loss, depression, unexplained weight gain.  -continue home dose medication: levothyroxine 25 mcg daily      DM2   -continue to monitor blood glucose with fingersticks  qachs  -changed sliding scale to intermediate ss   -monitor for hypoglycemia      Nutrition: Cardiac diabetic 1800 eh restriction diet  DVTP: on heparin gtt  Code: DNR      Grisel Liang MD  Cardiology Hospitalist  [facility]  SpectraLink: [id number]

## 2017-04-24 NOTE — PT/OT/SLP PROGRESS
"Physical Therapy  Treatment    Millie Thompson   MRN: [id number]   Admitting Diagnosis: Acute MI, anterior wall, subsequent episode of care    PT Received On: 17  PT Start Time: 1107     PT Stop Time: 1125    PT Total Time (min): 18 min       Billable Minutes:  Therapeutic Activity 18    Treatment Type: Treatment  PT/PTA: PTA     PTA Visit Number: 1       General Precautions: Standard, fall  Orthopedic Precautions: N/A   Braces: N/A    Do you have any cultural, spiritual, Baptist conflicts, given your current situation?: None    Subjective:  Communicated with Concha carrillo prior to session.  Pt agreeable to being transferred from the chair to the bed, but refused any additional therapy 2/2 pain.    Pain Ratin/10  Pain Addressed: Nurse notified  Pain Rating Post-Intervention: 9/10    Objective:   Patient found with: telemetry, pulse ox (continuous), blood pressure cuff, PICC line, oliveira catheter    Functional Mobility:  Bed Mobility:   Scooting/Bridging: Total Assistance, Other (see comments) (with assist of 3, drawsheet transfer to the [facility])  Sit to Supine: With leg lift, Moderate Assistance, With assist of  2 (guidance at trunk )    Transfers:  Sit <> Stand Assistance: Total Assistance (vcs for hand placement and "rocking" to build momentum for standing. )  Sit <> Stand Assistive Device: Rolling Walker  Bed <> Chair Technique: Other (see comments) (Pt ambulates 6 feet to bed. )  Bed <> Chair Assistance: Minimum Assistance  Bed <> Chair Assistive Device: Rolling Walker    Gait:   Gait Distance: 6 feet from bedside chair to bed.   Assistance 1: Minimum assistance  Gait Assistive Device: Rolling walker  Gait Pattern: 3-point gait  Gait Deviation(s): decreased kong, increased time in double stance, decreased velocity of limb motion, decreased step length, decreased stride length, increased stride width, decreased weight-shifting ability, forward lean, other (see comments)    Balance:   Static Sit: FAIR+: Able " to take MINIMAL challenges from all directions  Static Stand: FAIR+: Takes MINIMAL challenges from all directions  Dynamic stand: FAIR: Needs CONTACT GUARD during gait     Therapeutic Activities and Exercises:  White board updated.   Donned/doffed gait belt.     AM-PAC 6 CLICK MOBILITY  How much help from another person does this patient currently need?   1 = Unable, Total/Dependent Assistance  2 = A lot, Maximum/Moderate Assistance  3 = A little, Minimum/Contact Guard/Supervision  4 = None, Modified [place]/Independent    Turning over in bed (including adjusting bedclothes, sheets and blankets)?: 3  Sitting down on and standing up from a chair with arms (e.g., wheelchair, bedside commode, etc.): 2  Moving from lying on back to sitting on the side of the bed?: 2  Moving to and from a bed to a chair (including a wheelchair)?: 2  Need to walk in hospital room?: 2  Climbing 3-5 steps with a railing?: 1  Total Score: 12    AM-PAC Raw Score CMS G-Code Modifier Level of Impairment Assistance   6 % Total / Unable   7 - 9 CM 80 - 100% Maximal Assist   10 - 14 CL 60 - 80% Moderate Assist   15 - 19 CK 40 - 60% Moderate Assist   20 - 22 CJ 20 - 40% Minimal Assist   23 CI 1-20% SBA / CGA   24 CH 0% Independent/ Mod I     Patient left HOB elevated with all lines intact, call button in reach and nsg, doctors and family present.    Assessment:  Millie Thompson is a 81 y.o. female with a medical diagnosis of Acute MI, anterior wall, subsequent episode of care and presents with the rehab identified problem list below. Pt's family was present and would complete tasks for the patient during treatment. Pt with 1 attempt with a sit <> stand min/mod A, but the pt's brother provided total A during attempt and pt unwilling to attempt again 2/2 pain. Pt would continue to benefit from skilled PT to address the deficits in her plan of care.      Rehab identified problem list/impairments: Rehab identified problem  list/impairments: weakness, impaired endurance, impaired functional mobilty, gait instability, impaired balance, decreased lower extremity function, decreased safety awareness, pain, impaired cardiopulmonary response to activity, impaired coordination    Rehab potential is good.    Activity tolerance: Fair    Discharge recommendations: Discharge Facility/Level Of Care Needs: nursing facility, skilled     Barriers to discharge: Barriers to Discharge: Decreased caregiver support    Equipment recommendations: Equipment Needed After Discharge: none (pt owns needed equipment)     GOALS:   Physical Therapy Goals        Problem: Physical Therapy Goal    Goal Priority Disciplines Outcome Goal Variances Interventions   Physical Therapy Goal     PT/OT, PT Ongoing (interventions implemented as appropriate)     Description:  Goals to be met by: 2017     Patient will increase functional independence with mobility by performin. Supine to sit with Stand-by Assistance  2. Sit to supine with Stand-by Assistance  3. Sit to stand transfer with Minimal Assistance  4. Bed to chair transfer with Minimal Assistance using Rolling Walker  5. Gait  x 50 feet with Minimal Assistance using Rolling Walker.                 PLAN:    Patient to be seen 5 x/week  to address the above listed problems via therapeutic activities, therapeutic exercises, gait training  Plan of Care expires: 17  Plan of Care reviewed with: patient, family         Siva Hernandez, STEVE  2017

## 2017-04-24 NOTE — PROGRESS NOTES
DISCHARGE    Pt presents in room with dgtr [place] (ph [phone]) and granddgtr Yadi. Pt presents aaox4 with neutral affect. Pt states preference for [facility] (ph [phone], fax [phone]). Pt to discharge today via wheelchair van with 3.5L O2, NC. Pt reports coping appropriately and denies further needs, questions, concerns at this time and none indicated. Providing psychosocial and counseling support, education, resources, assistance and discharge planning as indicated. SW remains available.    SW faxed referral and orders to [facility] and is awaiting confirmation of delivery of DME to pt's home. VELIA will order ambulance to transport pt home. SW in communication with pt's RN and pt's dgtr, as well as hospice, to facilitate safe and appropriate discharge.    Confirmation received from hospice that DME will be delivered by 4pm to pt's home. [facility] phoned [facility] to transport pt home via ambulance at 4pm. Informed pt's RN of same.

## 2017-04-24 NOTE — DISCHARGE SUMMARY
[facility]  Cardiology Discharge Summary to Home Hospice    Patient Name: Millie Thompson  MRN: [id number]  Admission Date: 4/17/2017  Hospital Length of Stay: 7 days  Discharge Date and Time:  04/24/2017 12:02 PM  Attending Physician: Norm Hdz Jr.,*  Discharging Provider: Grisel Liang MD  Primary Care Physician: Alonzo Jean MD    HPI:   Ms. Thompson is an 81 y.o. female with past medical history of CAD s/p NSTEMI 2015 s/p 3V CABG (SVG-LAD, SVG Y graft 1st diagonal and RCA), HTN, HLD, PAF on coumadin, COPD, CVA without deficit 2012, anemia transferred from [facility] for further evaluation and treatment of muscular VSD s/p MI. Patient underwent outpatient colonoscopy 3/28/17 for evaluation of anemia (which by report was not very notable), experienced neck pain post procedure, went to ER 3/30/17 due to dyspnea and was given a steroid injection and discharged from ER. Family reports patient did not have EKG at that time. Dyspnea and neck pain worsened prompting presentation again to the ED on 4/3/17 at which time she had new LBBB and troponinemia upwards of 9. C performed 4/7/17 which showed occluded vein graft to LAD, akenetic apex with apical muscular VSD. Echo 4/11/17 showed EF 30-35%, LVEDD 5 cm, normal RV, PAS 60-70 mmHg, mild to moderate AS. There was questionable hx of apical thrombus per report but this was not noted on echo report? Patient was evaluated by CTS who recommended 6-8 week cooling off period before they would operate. Patient transferred to [facility] for eval of percutaneous repair.      She denies any chest pain or palpitations. She does endorse shortness of breath with minimal exertion and mild BLE swelling. She denies N/V/D. Endorses decreased appetite. Denies fever or chills. Endorses night sweats and intermittent diaphoresis with dyspneic episodes since colonoscopy in March.      She was admitted to the Hospital Medicine Service for further  management    Procedure(s) (LRB):  REPAIR-ATRIAL SEPTAL DEFECT (N/A)     Indwelling Lines/Drains at time of discharge:  Lines/Drains/Airways     Peripherally Inserted Central Catheter Line                 PICC Double Lumen 04/20/17 1205 right brachial 3 days          Drain                 Urethral Catheter 04/19/17 1050 5 days                Hospital Course     82 yo F female w ICM, DM2, HTN, pAFib on coumadin who was admitted to CMICU from medicine with concern for cardiogenic shock, post-ACS and new VSD    Cardiogenic shock w acute on chronic systolic HF (since admission)  S/p recent MI and VSD  Pt started on dobutamine which was stopped when decision for hospice was made.  We will continue IV lasix 40mg pushes for comfort    Ventricular septal defect (VSD)  Pt was evaluated by CT surgery and INV cardiology and found to not be a candidate for further intervention due in part to her comorbidities.  We attempted to improve her renal function but unable to.       Jaw pain (4/23)  Pt reported that she had new jaw pain this morning similar to previous MI. Pt has old LBBB. New ST elevations found in anteriorseptal territory. Pt was found not to be suitable candidate fore PCI or heart catheterization.    Paroxsymal AF (since admission)  Currently in sinus rhythm      Acute on chronic renal failure (since admission)  Pt was aggressively diuresed to optimize her status, which we were unable to do.  Family and patient opted for CMO and hospice care.      Consults:   Consults         Status Ordering Provider     Inpatient consult to Cardiology  Once     Provider:  (Not yet assigned)    Completed YOLY SHABAZZ     Inpatient consult to Cardiothoracic Surgery  Once     Provider:  Erick Savage MD    Completed JEISON GOOD     Inpatient consult to Dietary  Once     Provider:  (Not yet assigned)    Completed WAQAS CHAVIS JR     Inpatient consult to Interventional Cardiology  Once     Provider:  Erick Savage,  MD    Completed JEISON GOOD     Inpatient consult to PICC team ([facility])  Once     Provider:  (Not yet assigned)    Completed CAN DIEZ     Inpatient consult to PICC team ([facility])  Once     Provider:  (Not yet assigned)    Completed CAN DIEZ     Inpatient consult to PICC team ([facility])  Once     Provider:  (Not yet assigned)    Completed ANDREI NAJERA     Nutrition Services Referral  Once     Provider:  (Not yet assigned)    Completed HANY ORTEGA          Final Active Diagnoses:    Diagnosis Date Noted POA    PRINCIPAL PROBLEM:  Acute MI, anterior wall, subsequent episode of care complicated by acute VSD [I21.09] 04/17/2017 Yes    Acute renal insufficiency [N28.9] 04/19/2017 Yes    Elevated transaminase level [R74.0] 04/19/2017 Yes    Hypertensive heart and kidney disease with heart failure [I13.0] 04/19/2017 Yes    VSD (ventricular septal defect), muscular, acut post-anterior MI [Q21.0] 04/17/2017 Not Applicable    Essential hypertension [I10] 04/17/2017 Yes    Type 2 diabetes mellitus with diabetic neuropathy, with long-term current use of insulin [E11.40, Z79.4] 04/17/2017 Yes    Hypothyroidism due to acquired atrophy of thyroid [E03.4] 04/17/2017 Yes    Coronary artery disease involving coronary bypass graft of native heart without angina pectoris [I25.810] 04/17/2017 Yes    MARY (obstructive sleep apnea) [G47.33] 04/17/2017 Yes    Hx of paroxysmal a-fib [I48.0] 04/17/2017 Yes    Anemia [D64.9] 04/17/2017 Yes    GERD (gastroesophageal reflux disease) [K21.9] 04/17/2017 Yes    Diabetic polyneuropathy associated with type 2 diabetes mellitus [E11.42] 04/17/2017 Yes      Problems Resolved During this Admission:    Diagnosis Date Noted Date Resolved POA       Discharged Condition: comfortable    Disposition: to home hospice    Medications to continue for comfort:    furosemide injection 40 mg  40 mg, Intravenous, 2 times daily, First dose on Mon 4/24/17 at 1800      Time spent on the  discharge of patient: 25 minutes    Grisel Liang MD  Cardiology  [facility]

## 2017-04-24 NOTE — PLAN OF CARE
Problem: Patient Care Overview  Goal: Plan of Care Review  Outcome: Ongoing (interventions implemented as appropriate)  Pt free of falls/traumas/injuries. Skin remains clean, dry, and intact. Pt weaned off of dobutamine gtt and heparin and lasix gtt stopped. Per Dr. Liang ok to leave PICC line and oliveira for comfort of pt. Per pt's family  Request will leave PICC and oliveira for hospice comfort.   Pt's comfort assessed and given the highest priority. Reviewed plan of care with pt and daughter to go home with hospice; and pt  And daughter verbalized understanding.  Pt VSS in no distress will continue to monitor.

## 2017-04-24 NOTE — PLAN OF CARE
Problem: Physical Therapy Goal  Goal: Physical Therapy Goal  Goals to be met by: 2017     Patient will increase functional independence with mobility by performin. Supine to sit with Stand-by Assistance  2. Sit to supine with Stand-by Assistance  3. Sit to stand transfer with Minimal Assistance  4. Bed to chair transfer with Minimal Assistance using Rolling Walker  5. Gait x 50 feet with Minimal Assistance using Rolling Walker.    Outcome: Ongoing (interventions implemented as appropriate)  Goals remain appropriate. Continue with POC.

## 2017-04-24 NOTE — PHYSICIAN QUERY
PT Name: Millie Thompson  MR #: [id number]  Physician Query Form - CKD Clarification     CDS/: Mary Carmen [facility] RN CCDS               Contact information: [email or web address]  This form is a permanent document in the medical record.     Query Date: April 24, 2017    By submitting this query, we are merely seeking further clarification of documentation. Please utilize your independent clinical judgment when addressing the question(s) below.    The Medical record contains the following:     Indicators   Supporting Clinical Findings   Location in Medical Record   X CKD or Chronic Kidney (Renal) Failure / Disease Acute on chronic renal failure  - baseline Cr: 1.5 4/22, 4/23 pn's    X BUN/Creatinine                          GFR Cr 1.5-->2.9 -->2.5  gfr 32.7-->14.7-->17.6 4/17, 4/20, 4/24 lab    Dehydration      Nausea / Vomiting      Dialysis / CRRT      Medication      Treatment     X Other Chronic Conditions  Hypertensive heart and kidney disease with heart failure       4/21 thru 4/23 pn's    Other       Provider, please further specify the stage of CKD.      [  ] Chronic Kidney Disease (CKD) (please specify stage* below)       [employer] Definitions  Stage Description  eGFR (mL/min)   [  ]     I Slight kidney damage with normal or increased filtration 90+   [  ]     II Mildly reduced kidney function 60-89   [  ]     III Moderately reduced kidney function 30-59   [ x ]     IV Severely reduced kidney function 15-29   [  ]     V Kidney failure, requiring transplant or dialysis <15     [  ] Other (please specify): ________________________  [  ] Clinically Undetermined    Please document in your progress notes daily for the duration of treatment until resolved and include in your discharge summary.

## 2017-04-24 NOTE — PROGRESS NOTES
Hospital Day: 7    Subjective:  Interval History:     Pt feels worse today.  Generalized pain all night.  Jaw pain since 4am.  After discussion with Dr. Hdz, pt has decide to go home-still deciding about with dobutamine vs hospice     Scheduled Meds:   aspirin  81 mg Oral Daily    clopidogrel  75 mg Oral Daily    diphenhydrAMINE  25 mg Oral Once    duloxetine  60 mg Oral Nightly    hydrALAZINE  50 mg Oral Q6H    isosorbide dinitrate  20 mg Oral Q6H    levothyroxine  25 mcg Oral Daily    pantoprazole  40 mg Oral Daily    pravastatin  80 mg Oral QHS    senna-docusate 8.6-50 mg  1 tablet Oral Daily    sodium chloride 0.9%  10 mL Intravenous Q6H    sodium chloride 0.9%  3 mL Intravenous Q8H     Continuous Infusions:   DOBUTamine 5 mcg/kg/min (04/21/17 2200)    furosemide (LASIX) 5 mg/mL infusion (titrating) 30 mg/hr (04/22/17 2017)    heparin (porcine) in D5W 6.969 Units/kg/hr (04/21/17 2200)     PRN Meds:acetaminophen, dextrose 50%, dextrose 50%, glucagon (human recombinant), glucose, glucose, HYDROmorphone, insulin aspart, levalbuterol, ondansetron, ondansetron, polyethylene glycol, simethicone, Flushing PICC Protocol **AND** sodium chloride 0.9% **AND** sodium chloride 0.9%, zolpidem    Objective:  Vital signs in last 24 hours:   Temp:  [97.7 °F (36.5 °C)-98.5 °F (36.9 °C)] 97.7 °F (36.5 °C)  Pulse:  [107-120] 115  Resp:  [14-24] 20  SpO2:  [92 %-96 %] 95 %  BP: ()/(59-84) 110/71    Intake/Output last 3 shifts:  I/O last 3 completed shifts:  In: 755 [P.O.:420; I.V.:335]  Out: 2505 [Urine:2505]    Physical Exam:  General Appearance:    Alert, cooperative, no distress   Lungs:     Clear to auscultation bilaterally, respirations unlabored    Heart:    JVP 16 cm H2O, Regular rate and rhythm, S1+S2+no added sounds   Abdomen:     Soft, non-tender, bowel sounds active, no masses, no organomegaly   Extremities:   +1 edema b/l, pulses thready b/l         Lab Review  Lab Results   Component Value Date      04/23/2017     04/22/2017     (L) 04/21/2017    K 3.3 (L) 04/23/2017    K 3.5 04/22/2017    K 3.3 (L) 04/21/2017    CO2 32 (H) 04/23/2017    CO2 35 (H) 04/22/2017    CO2 29 04/21/2017    BUN 53 (H) 04/23/2017    BUN 56 (H) 04/22/2017    BUN 64 (H) 04/21/2017    CREATININE 2.3 (H) 04/23/2017    CREATININE 2.3 (H) 04/22/2017    CREATININE 2.7 (H) 04/21/2017    CALCIUM 8.9 04/23/2017    CALCIUM 8.8 04/22/2017    CALCIUM 8.5 (L) 04/21/2017     Lab Results   Component Value Date    WBC 6.05 04/23/2017    WBC 7.49 04/22/2017    WBC 10.01 04/21/2017    HGB 8.5 (L) 04/23/2017    HGB 8.7 (L) 04/22/2017    HGB 8.8 (L) 04/21/2017    HCT 27.2 (L) 04/23/2017    HCT 29.1 (L) 04/22/2017    HCT 28.7 (L) 04/21/2017    MCV 81 (L) 04/23/2017    MCV 83 04/22/2017    MCV 82 04/21/2017     04/23/2017     04/22/2017     04/21/2017        Assessment/Plan:  Congestive Heart Failure unchanged    Active Hospital Problems    Diagnosis  POA    *Acute MI, anterior wall, subsequent episode of care complicated by acute VSD [I21.09]  Yes    Acute renal insufficiency [N28.9]  Yes    Elevated transaminase level [R74.0]  Yes    Hypertensive heart and kidney disease with heart failure [I13.0]  Yes    VSD (ventricular septal defect), muscular, acut post-anterior MI [Q21.0]  Not Applicable    Essential hypertension [I10]  Yes    Type 2 diabetes mellitus with diabetic neuropathy, with long-term current use of insulin [E11.40, Z79.4]  Yes    Hypothyroidism due to acquired atrophy of thyroid [E03.4]  Yes    Coronary artery disease involving coronary bypass graft of native heart without angina pectoris [I25.810]  Yes    MARY (obstructive sleep apnea) [G47.33]  Yes    Hx of paroxysmal a-fib [I48.0]  Yes    Anemia [D64.9]  Yes    GERD (gastroesophageal reflux disease) [K21.9]  Yes    Diabetic polyneuropathy associated with type 2 diabetes mellitus [E11.42]  Yes      Resolved Hospital Problems    Diagnosis  Date Resolved POA   No resolved problems to display.     Lasix drip up to 30  No CXR  EKG, troponin ordered  Dilaudid for pain 0.5 q6 PRN                80 yo F female w ICM, DM2, HTN, pAFib on coumadin who was transferred to CMICU from medicine with concern for cardiogenic shock, post-ACS and new VSD    Jaw pain  Pt reported that she had new jaw pain this morning since 4am.  Similar to pain as to when she had her infarction.  No chest pain with previous infarct.  -checked troponin and bumped to 0.389 then to 0.376  -EKG ordered.  Pt has old LBBB.  New ST elevations found in anteriorseptal territory.  Unclear is ST elevation related to new infarct or existing VSD.  Pt no escalation of care at this time so will not proceed to cath lab  -Attending will have further goals of care discussion later today      Cardiogenic shock w acute on chronic systolic HF  - pt has decided that she would like to go home; undecided still about hospice vs on dobutamine  - attending discussion with family: DNR/DNI, no escalation of care   - continue aspirin clopidogrel, pravastatin (allergy to atorvastatin)  - decreased isosorbide dinitrate to 20mg  - continue  5 mcg/kg/min  - increased IV Lasix gtt 40 mg/h   -PA and lateral CXR to assess volume status      Paroxsymal AF  - hold sotalol in the setting of ADHF  - continue IV UFH, can bridge to Eliquis on discharge, meets criteria for reduced dose of 2.5 BID if pt opts for home with dobutamine      Acute on chronic renal failure  - Cr/BUN: 2.3 from 2.3  - baseline Cr: 1.5  - avoid nephrotoxic medication  - monitor daily BUN and Cr      Essential HTN  - currently controlled  - continue hydralazine + ISDN, titrate complete      Normocytic Anemia  - no active bleed  - stable w H/H 8.5/27  - continue to monitor      Hypothyroidism  Pt is currently asymptomatic. Denies cold intolerance, constipation, malaise, hair loss, depression, unexplained weight gain.  -continue home dose medication:  levothyroxine 25 mcg daily      DM2   -continue to monitor blood glucose with fingersticks qachs  -changed sliding scale to intermediate ss   -monitor for hypoglycemia      Nutrition: Cardiac diabetic 1800 eh restriction diet  DVTP: on heparin gtt  Code: DNR      Grisel Liang MD  Cardiology Hospitalist  [facility]  SpectraLink: [id number]

## 2017-04-25 NOTE — PT/OT/SLP DISCHARGE
Occupational Therapy Discharge Summary    Millie Thompson  MRN: [id number]   Acute MI, anterior wall, subsequent episode of care   Patient Discharged from acute Occupational Therapy on 4/25/2017.  Please refer to prior OT note dated on 4/21/17  for functional status.     Assessment:   Patient appropriate for care in another setting.  GOALS:   Occupational Therapy Goals        Problem: Occupational Therapy Goal    Goal Priority Disciplines Outcome Interventions   Occupational Therapy Goal     OT, PT/OT     Description:  Goals to be met by:  2 weeks 5/4/17     Patient will increase functional independence with ADLs by performing:    UE Dressing with Supervision.  LE Dressing with Supervision.  Grooming while standing with Supervision.  Toileting from toilet with Supervision for hygiene and clothing management.   Stand pivot transfers with Supervision.  Toilet transfer to toilet with Supervision.              Reasons for Discontinuation of Therapy Services  Transfer to alternate level of care.      Plan:  Patient Discharged to: Palliative Care/Hospice. Pt d/c home with hospice.  IRASEMA Blunt  4/25/2017  .